# Patient Record
Sex: FEMALE | Race: WHITE | HISPANIC OR LATINO | Employment: UNEMPLOYED | URBAN - METROPOLITAN AREA
[De-identification: names, ages, dates, MRNs, and addresses within clinical notes are randomized per-mention and may not be internally consistent; named-entity substitution may affect disease eponyms.]

---
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
## 2024-09-23 ENCOUNTER — LAB VISIT (OUTPATIENT)
Dept: LAB | Facility: HOSPITAL | Age: 24
End: 2024-09-23
Attending: STUDENT IN AN ORGANIZED HEALTH CARE EDUCATION/TRAINING PROGRAM
Payer: MEDICAID

## 2024-09-23 ENCOUNTER — OFFICE VISIT (OUTPATIENT)
Dept: OBSTETRICS AND GYNECOLOGY | Facility: CLINIC | Age: 24
End: 2024-09-23
Payer: MEDICAID

## 2024-09-23 VITALS — DIASTOLIC BLOOD PRESSURE: 76 MMHG | WEIGHT: 139.31 LBS | SYSTOLIC BLOOD PRESSURE: 112 MMHG

## 2024-09-23 DIAGNOSIS — N91.2 AMENORRHEA: Primary | ICD-10-CM

## 2024-09-23 DIAGNOSIS — N91.2 AMENORRHEA: ICD-10-CM

## 2024-09-23 LAB
ABO + RH BLD: NORMAL
BASOPHILS # BLD AUTO: 0.02 K/UL (ref 0–0.2)
BASOPHILS NFR BLD: 0.3 % (ref 0–1.9)
BLD GP AB SCN CELLS X3 SERPL QL: NORMAL
DIFFERENTIAL METHOD BLD: ABNORMAL
EOSINOPHIL # BLD AUTO: 0.2 K/UL (ref 0–0.5)
EOSINOPHIL NFR BLD: 1.9 % (ref 0–8)
ERYTHROCYTE [DISTWIDTH] IN BLOOD BY AUTOMATED COUNT: 14.9 % (ref 11.5–14.5)
HBV SURFACE AG SERPL QL IA: NORMAL
HCT VFR BLD AUTO: 34.3 % (ref 37–48.5)
HCV AB SERPL QL IA: NORMAL
HGB BLD-MCNC: 11.4 G/DL (ref 12–16)
HIV 1+2 AB+HIV1 P24 AG SERPL QL IA: NORMAL
IMM GRANULOCYTES # BLD AUTO: 0.02 K/UL (ref 0–0.04)
IMM GRANULOCYTES NFR BLD AUTO: 0.3 % (ref 0–0.5)
LYMPHOCYTES # BLD AUTO: 2.2 K/UL (ref 1–4.8)
LYMPHOCYTES NFR BLD: 27.9 % (ref 18–48)
MCH RBC QN AUTO: 27 PG (ref 27–31)
MCHC RBC AUTO-ENTMCNC: 33.2 G/DL (ref 32–36)
MCV RBC AUTO: 81 FL (ref 82–98)
MONOCYTES # BLD AUTO: 0.5 K/UL (ref 0.3–1)
MONOCYTES NFR BLD: 6.7 % (ref 4–15)
NEUTROPHILS # BLD AUTO: 4.9 K/UL (ref 1.8–7.7)
NEUTROPHILS NFR BLD: 62.9 % (ref 38–73)
NRBC BLD-RTO: 0 /100 WBC
PLATELET # BLD AUTO: 236 K/UL (ref 150–450)
PMV BLD AUTO: 10.4 FL (ref 9.2–12.9)
RBC # BLD AUTO: 4.22 M/UL (ref 4–5.4)
SPECIMEN OUTDATE: NORMAL
TREPONEMA PALLIDUM IGG+IGM AB [PRESENCE] IN SERUM OR PLASMA BY IMMUNOASSAY: NONREACTIVE
WBC # BLD AUTO: 7.77 K/UL (ref 3.9–12.7)

## 2024-09-23 PROCEDURE — 36415 COLL VENOUS BLD VENIPUNCTURE: CPT | Performed by: STUDENT IN AN ORGANIZED HEALTH CARE EDUCATION/TRAINING PROGRAM

## 2024-09-23 PROCEDURE — 1160F RVW MEDS BY RX/DR IN RCRD: CPT | Mod: CPTII,,, | Performed by: STUDENT IN AN ORGANIZED HEALTH CARE EDUCATION/TRAINING PROGRAM

## 2024-09-23 PROCEDURE — 86762 RUBELLA ANTIBODY: CPT | Performed by: STUDENT IN AN ORGANIZED HEALTH CARE EDUCATION/TRAINING PROGRAM

## 2024-09-23 PROCEDURE — 87491 CHLMYD TRACH DNA AMP PROBE: CPT | Performed by: STUDENT IN AN ORGANIZED HEALTH CARE EDUCATION/TRAINING PROGRAM

## 2024-09-23 PROCEDURE — 86850 RBC ANTIBODY SCREEN: CPT | Performed by: STUDENT IN AN ORGANIZED HEALTH CARE EDUCATION/TRAINING PROGRAM

## 2024-09-23 PROCEDURE — 87389 HIV-1 AG W/HIV-1&-2 AB AG IA: CPT | Performed by: STUDENT IN AN ORGANIZED HEALTH CARE EDUCATION/TRAINING PROGRAM

## 2024-09-23 PROCEDURE — 86593 SYPHILIS TEST NON-TREP QUANT: CPT | Performed by: STUDENT IN AN ORGANIZED HEALTH CARE EDUCATION/TRAINING PROGRAM

## 2024-09-23 PROCEDURE — 86803 HEPATITIS C AB TEST: CPT | Performed by: STUDENT IN AN ORGANIZED HEALTH CARE EDUCATION/TRAINING PROGRAM

## 2024-09-23 PROCEDURE — 88142 CYTOPATH C/V THIN LAYER: CPT | Performed by: STUDENT IN AN ORGANIZED HEALTH CARE EDUCATION/TRAINING PROGRAM

## 2024-09-23 PROCEDURE — 99202 OFFICE O/P NEW SF 15 MIN: CPT | Mod: PBBFAC,PO,25 | Performed by: STUDENT IN AN ORGANIZED HEALTH CARE EDUCATION/TRAINING PROGRAM

## 2024-09-23 PROCEDURE — 86901 BLOOD TYPING SEROLOGIC RH(D): CPT | Performed by: STUDENT IN AN ORGANIZED HEALTH CARE EDUCATION/TRAINING PROGRAM

## 2024-09-23 PROCEDURE — 99999 PR PBB SHADOW E&M-NEW PATIENT-LVL II: CPT | Mod: PBBFAC,,, | Performed by: STUDENT IN AN ORGANIZED HEALTH CARE EDUCATION/TRAINING PROGRAM

## 2024-09-23 PROCEDURE — 3074F SYST BP LT 130 MM HG: CPT | Mod: CPTII,,, | Performed by: STUDENT IN AN ORGANIZED HEALTH CARE EDUCATION/TRAINING PROGRAM

## 2024-09-23 PROCEDURE — 86900 BLOOD TYPING SEROLOGIC ABO: CPT | Performed by: STUDENT IN AN ORGANIZED HEALTH CARE EDUCATION/TRAINING PROGRAM

## 2024-09-23 PROCEDURE — 87340 HEPATITIS B SURFACE AG IA: CPT | Performed by: STUDENT IN AN ORGANIZED HEALTH CARE EDUCATION/TRAINING PROGRAM

## 2024-09-23 PROCEDURE — 87086 URINE CULTURE/COLONY COUNT: CPT | Performed by: STUDENT IN AN ORGANIZED HEALTH CARE EDUCATION/TRAINING PROGRAM

## 2024-09-23 PROCEDURE — 3078F DIAST BP <80 MM HG: CPT | Mod: CPTII,,, | Performed by: STUDENT IN AN ORGANIZED HEALTH CARE EDUCATION/TRAINING PROGRAM

## 2024-09-23 PROCEDURE — 1159F MED LIST DOCD IN RCRD: CPT | Mod: CPTII,,, | Performed by: STUDENT IN AN ORGANIZED HEALTH CARE EDUCATION/TRAINING PROGRAM

## 2024-09-23 PROCEDURE — 85025 COMPLETE CBC W/AUTO DIFF WBC: CPT | Performed by: STUDENT IN AN ORGANIZED HEALTH CARE EDUCATION/TRAINING PROGRAM

## 2024-09-23 PROCEDURE — 87591 N.GONORRHOEAE DNA AMP PROB: CPT | Performed by: STUDENT IN AN ORGANIZED HEALTH CARE EDUCATION/TRAINING PROGRAM

## 2024-09-23 PROCEDURE — 99204 OFFICE O/P NEW MOD 45 MIN: CPT | Mod: S$PBB,,, | Performed by: STUDENT IN AN ORGANIZED HEALTH CARE EDUCATION/TRAINING PROGRAM

## 2024-09-23 NOTE — PROGRESS NOTES
Milagro Botello is a 23 yo  who presents to establish care for pregnancy. She was seen in the ED and underwent an US on 24 which demonstrated an IUP with cardiac activity and a B-HCG around 14,000. She denies any vaginal bleeding or abdominal pain at this time. Patient's last menstrual period was 2024 (approximate). Her UPT is Positive. This was an unplanned but now desired pregnancy. She denies PMH. Her PSH is significant for a  and another laparotomy for ovarian cysts. She is unsure why she had a  during her first pregnancy, 4 years ago. She is taking a PNV and is vaccinated against COVID.     History reviewed. No pertinent past medical history.  History reviewed. No pertinent surgical history.  No family history on file.  Review of patient's allergies indicates:   Allergen Reactions    Cinnamon Rash     Social History     Socioeconomic History    Marital status: Single   Tobacco Use    Smoking status: Never     Passive exposure: Never    Smokeless tobacco: Never   Substance and Sexual Activity    Alcohol use: Not Currently    Drug use: Never    Sexual activity: Yes     Partners: Male       ROS:  GENERAL: No fever, chills, fatigability or weight loss.  VULVAR: No pain, no lesions and no itching.  VAGINAL: No relaxation, no itching, no discharge, no abnormal bleeding and no lesions.  ABDOMEN: No abdominal pain. Denies nausea. Denies vomiting. No diarrhea. No constipation  BREAST: Denies pain. No lumps. No discharge.  URINARY: No incontinence, no nocturia, no frequency and no dysuria.  CARDIOVASCULAR: No chest pain. No shortness of breath. No leg cramps.  NEUROLOGICAL: no headaches. No vision changes.      Vitals:    24 1410   BP: 112/76     GENERAL: healthy  NECK: thyroid is normal in size without nodules or tenderness  ABDOMEN: Normal, benign.  GENITALIA: normal external genitalia, no erythema, no discharge  URETHRA: normal appearing urethra with no masses, tenderness  or lesions  VAGINA: normal vagina  CERVIX: Normal  UTERUS: normal size  ADNEXA: normal adnexa    Bedside US: IUP with likely fetal cardiac activity appreciated    Milagro was seen today for possible pregnancy.    Diagnoses and all orders for this visit:    Amenorrhea  -     C. trachomatis/N. gonorrhoeae by AMP DNA [facility]; Cervix  -     Liquid-Based Pap Smear, Screening  -     Urine culture  -     Type & Screen; Future  -     CBC Auto Differential; Future  -     Hepatitis B Surface Antigen; Future  -     HIV 1/2 Ag/Ab (4th Gen); Future  -     Hepatitis C Antibody; Future  -     Rubella Antibody, IgG; Future  -     Treponema Pallidium Antibodies IgG, IgM; Future  -     US OB/GYN Procedure ([facility]); Future    Discussed pregnancy of unknown viability and appropriate precautions    Counseled to avoid cat litter, not garden without gloves, avoid raw meat, heat up deli meat, to eat large fish like tuna no more than once a week, and to avoid soft unpasteurized cheeses.  I recommend a PNV daily.  She should avoid ibuprofen.

## 2024-09-24 LAB
RUBV IGG SER-ACNC: 21.2 IU/ML
RUBV IGG SER-IMP: REACTIVE

## 2024-09-25 LAB
BACTERIA UR CULT: NORMAL
C TRACH DNA SPEC QL NAA+PROBE: NOT DETECTED
N GONORRHOEA DNA SPEC QL NAA+PROBE: NOT DETECTED

## 2024-09-26 ENCOUNTER — PROCEDURE VISIT (OUTPATIENT)
Dept: MATERNAL FETAL MEDICINE | Facility: CLINIC | Age: 24
End: 2024-09-26
Payer: MEDICAID

## 2024-09-26 DIAGNOSIS — N91.2 AMENORRHEA: ICD-10-CM

## 2024-09-26 PROCEDURE — 76801 OB US < 14 WKS SINGLE FETUS: CPT | Mod: PBBFAC,PO | Performed by: OBSTETRICS & GYNECOLOGY

## 2024-09-27 LAB
FINAL PATHOLOGIC DIAGNOSIS: NORMAL
Lab: NORMAL

## 2024-10-21 ENCOUNTER — LAB VISIT (OUTPATIENT)
Dept: LAB | Facility: HOSPITAL | Age: 24
End: 2024-10-21
Attending: STUDENT IN AN ORGANIZED HEALTH CARE EDUCATION/TRAINING PROGRAM
Payer: MEDICAID

## 2024-10-21 ENCOUNTER — ROUTINE PRENATAL (OUTPATIENT)
Dept: OBSTETRICS AND GYNECOLOGY | Facility: CLINIC | Age: 24
End: 2024-10-21
Payer: MEDICAID

## 2024-10-21 VITALS — SYSTOLIC BLOOD PRESSURE: 115 MMHG | WEIGHT: 141.13 LBS | DIASTOLIC BLOOD PRESSURE: 78 MMHG

## 2024-10-21 DIAGNOSIS — Z98.891 HISTORY OF CESAREAN SECTION: ICD-10-CM

## 2024-10-21 DIAGNOSIS — Z34.81 ENCOUNTER FOR SUPERVISION OF OTHER NORMAL PREGNANCY IN FIRST TRIMESTER: ICD-10-CM

## 2024-10-21 DIAGNOSIS — Z3A.09 9 WEEKS GESTATION OF PREGNANCY: Primary | ICD-10-CM

## 2024-10-21 PROBLEM — Z34.91 ENCOUNTER FOR SUPERVISION OF NORMAL PREGNANCY IN FIRST TRIMESTER: Status: ACTIVE | Noted: 2024-10-21

## 2024-10-21 LAB
BILIRUB SERPL-MCNC: NORMAL MG/DL
BLOOD URINE, POC: NORMAL
CLARITY UR: CLEAR
COLOR, POC UA: YELLOW
GLUCOSE UR QL STRIP: NORMAL
KETONES UR QL STRIP: NORMAL
LEUKOCYTE ESTERASE URINE, POC: NORMAL
NITRITE, POC UA: NORMAL
PH, POC UA: 6
PROTEIN, POC: NORMAL
SPECIFIC GRAVITY, POC UA: 1.03
UROBILINOGEN, POC UA: 0.2

## 2024-10-21 PROCEDURE — 99999PBSHW POCT URINE DIPSTICK WITHOUT MICROSCOPE: Mod: PBBFAC,,,

## 2024-10-21 PROCEDURE — 99212 OFFICE O/P EST SF 10 MIN: CPT | Mod: PBBFAC,TH,PO | Performed by: STUDENT IN AN ORGANIZED HEALTH CARE EDUCATION/TRAINING PROGRAM

## 2024-10-21 PROCEDURE — 84163 PAPPA SERUM: CPT | Performed by: STUDENT IN AN ORGANIZED HEALTH CARE EDUCATION/TRAINING PROGRAM

## 2024-10-21 PROCEDURE — 81002 URINALYSIS NONAUTO W/O SCOPE: CPT | Mod: PBBFAC,PO | Performed by: STUDENT IN AN ORGANIZED HEALTH CARE EDUCATION/TRAINING PROGRAM

## 2024-10-21 PROCEDURE — 99999 PR PBB SHADOW E&M-EST. PATIENT-LVL II: CPT | Mod: PBBFAC,,, | Performed by: STUDENT IN AN ORGANIZED HEALTH CARE EDUCATION/TRAINING PROGRAM

## 2024-10-21 PROCEDURE — 36415 COLL VENOUS BLD VENIPUNCTURE: CPT | Performed by: STUDENT IN AN ORGANIZED HEALTH CARE EDUCATION/TRAINING PROGRAM

## 2024-10-21 NOTE — PROGRESS NOTES
No chief complaint on file.      24 y.o., at 12w4d by Estimated Date of Delivery: 25    Complaints today: Patient doing well. No complaints at this time.     ROS  OBSTETRICS:   Contractions: n   Bleeding: n   Loss of fluid: n   Fetal movement: not yet  GASTRO:   Nausea: n   Vomiting: n      OB History    Para Term  AB Living   2 1 1     1   SAB IAB Ectopic Multiple Live Births                  # Outcome Date GA Lbr Dae/2nd Weight Sex Type Anes PTL Lv   2 Current            1 Term                Dating reviewed  Allergies and problem list reviewed and updated  Medical and surgical history reviewed  Prenatal labs reviewed and updated    PHYSICAL EXAM  /78   Wt 64 kg (141 lb 1.5 oz)   LMP 2024 (Approximate)     GENERAL: No acute distress  NEURO: Alert and oriented x3  PSYCH: Normal mood and affect  PULMONARY: Non-labored respiration  ABDomen: Soft, gravid, nontender    ASSESSMENT AND PLAN     Problems (from 10/21/24 to present)       Problem Noted Diagnosed Resolved    Encounter for supervision of normal pregnancy in first trimester 10/21/2024 by Rambo Andrade MD  No    History of  section 10/21/2024 by Rambo Andrade MD  No            Integrated 1 today     labor precautions given  Follow-up: 4 weeks

## 2024-10-25 LAB — INTEGRATED SCN PATIENT-IMP NAR: NORMAL

## 2024-11-18 ENCOUNTER — LAB VISIT (OUTPATIENT)
Dept: LAB | Facility: HOSPITAL | Age: 24
End: 2024-11-18
Attending: STUDENT IN AN ORGANIZED HEALTH CARE EDUCATION/TRAINING PROGRAM
Payer: MEDICAID

## 2024-11-18 ENCOUNTER — ROUTINE PRENATAL (OUTPATIENT)
Dept: OBSTETRICS AND GYNECOLOGY | Facility: CLINIC | Age: 24
End: 2024-11-18
Payer: MEDICAID

## 2024-11-18 VITALS — SYSTOLIC BLOOD PRESSURE: 107 MMHG | WEIGHT: 145.06 LBS | DIASTOLIC BLOOD PRESSURE: 70 MMHG

## 2024-11-18 DIAGNOSIS — Z3A.16 16 WEEKS GESTATION OF PREGNANCY: Primary | ICD-10-CM

## 2024-11-18 DIAGNOSIS — Z34.81 ENCOUNTER FOR SUPERVISION OF OTHER NORMAL PREGNANCY IN FIRST TRIMESTER: ICD-10-CM

## 2024-11-18 DIAGNOSIS — Z98.891 HISTORY OF CESAREAN SECTION: ICD-10-CM

## 2024-11-18 LAB
BILIRUB SERPL-MCNC: NORMAL MG/DL
BLOOD URINE, POC: NORMAL
CLARITY, POC UA: CLEAR
COLOR, POC UA: YELLOW
GLUCOSE UR QL STRIP: NORMAL
KETONES UR QL STRIP: NORMAL
LEUKOCYTE ESTERASE URINE, POC: NORMAL
NITRITE, POC UA: NORMAL
PH, POC UA: 6
PROTEIN, POC: NORMAL
SPECIFIC GRAVITY, POC UA: 1.02
UROBILINOGEN, POC UA: 0.2

## 2024-11-18 PROCEDURE — 81002 URINALYSIS NONAUTO W/O SCOPE: CPT | Mod: PBBFAC,PO | Performed by: STUDENT IN AN ORGANIZED HEALTH CARE EDUCATION/TRAINING PROGRAM

## 2024-11-18 PROCEDURE — 36415 COLL VENOUS BLD VENIPUNCTURE: CPT | Performed by: STUDENT IN AN ORGANIZED HEALTH CARE EDUCATION/TRAINING PROGRAM

## 2024-11-18 PROCEDURE — 99999PBSHW POCT URINE DIPSTICK WITHOUT MICROSCOPE: Mod: PBBFAC,,,

## 2024-11-18 PROCEDURE — 99213 OFFICE O/P EST LOW 20 MIN: CPT | Mod: TH,S$PBB,, | Performed by: STUDENT IN AN ORGANIZED HEALTH CARE EDUCATION/TRAINING PROGRAM

## 2024-11-18 PROCEDURE — 99212 OFFICE O/P EST SF 10 MIN: CPT | Mod: PBBFAC,TH,PO | Performed by: STUDENT IN AN ORGANIZED HEALTH CARE EDUCATION/TRAINING PROGRAM

## 2024-11-18 PROCEDURE — 81511 FTL CGEN ABNOR FOUR ANAL: CPT | Performed by: STUDENT IN AN ORGANIZED HEALTH CARE EDUCATION/TRAINING PROGRAM

## 2024-11-18 PROCEDURE — 99999 PR PBB SHADOW E&M-EST. PATIENT-LVL II: CPT | Mod: PBBFAC,,, | Performed by: STUDENT IN AN ORGANIZED HEALTH CARE EDUCATION/TRAINING PROGRAM

## 2024-11-18 RX ORDER — TRIAMCINOLONE ACETONIDE 1 MG/G
OINTMENT TOPICAL 2 TIMES DAILY
Qty: 80 G | Refills: 0 | Status: SHIPPED | OUTPATIENT
Start: 2024-11-18

## 2024-11-18 NOTE — PROGRESS NOTES
Chief Complaint   Patient presents with    Routine Prenatal Visit       24 y.o., at 16w4d by Estimated Date of Delivery: 25    Complaints today: Patient doing well apart from a mild rash in her inner thighs. No other complaints.     ROS  OBSTETRICS:   Contractions: n   Bleeding: n   Loss of fluid: n   Fetal movement: y  GASTRO:   Nausea: n   Vomiting: n      OB History    Para Term  AB Living   2 1 1     1   SAB IAB Ectopic Multiple Live Births                  # Outcome Date GA Lbr Dae/2nd Weight Sex Type Anes PTL Lv   2 Current            1 Term                Dating reviewed  Allergies and problem list reviewed and updated  Medical and surgical history reviewed  Prenatal labs reviewed and updated    PHYSICAL EXAM  /70   Wt 65.8 kg (145 lb 1 oz)   LMP 2024 (Approximate)     GENERAL: No acute distress  NEURO: Alert and oriented x3  PSYCH: Normal mood and affect  PULMONARY: Non-labored respiration  ABDomen: Soft, gravid, nontender    ASSESSMENT AND PLAN     Problems (from 10/21/24 to present)       Problem Noted Diagnosed Resolved    Encounter for supervision of normal pregnancy in first trimester 10/21/2024 by Rambo Andrade MD  No    History of  section 10/21/2024 by Rambo Andrade MD  No            Integrated 2  Anatomy scan scheduled  Triamcinolone ointment for rash     labor precautions given  Follow-up: 4 weeks

## 2024-11-21 LAB
# FETUSES US: NORMAL
AFP MOM SERPL: 0.84
AFP SERPL-MCNC: 30.6 NG/ML
AGE AT DELIVERY: 23
B-HCG MOM SERPL: 0.96
B-HCG SERPL-ACNC: 29.5 IU/ML
COLLECT DATE BLD: NORMAL
COLLECT DATE: NORMAL
FET TS 21 RISK FROM MAT AGE: NORMAL
GA (DAYS): 4 D
GA (WEEKS): 12 WK
GA METHOD: NORMAL
GEST. AGE (DAYS) 2ND SAMPLE (SI2): 4
GEST. AGE (WKS) 2ND SAMPLE (SI2): 16
IDDM PATIENT QL: NORMAL
INHIBIN A MOM SERPL: 1.36
INHIBIN A SERPL-MCNC: 210.5 PG/ML
INTEGRATED SCN PATIENT-IMP NAR: NORMAL
INTEGRATED SCN PATIENT-IMP: NEGATIVE
PAPP-A MOM SERPL: 0.79
PAPP-A SERPL-MCNC: 709.8 NG/ML
SMOKING STATUS FTND: NO
TS 18 RISK FETUS: NORMAL
TS 21 RISK FETUS: NORMAL
U ESTRIOL MOM SERPL: 1.21
U ESTRIOL SERPL-MCNC: 1.56 NG/ML

## 2024-12-16 ENCOUNTER — TELEPHONE (OUTPATIENT)
Dept: OBSTETRICS AND GYNECOLOGY | Facility: CLINIC | Age: 24
End: 2024-12-16
Payer: MEDICAID

## 2024-12-16 NOTE — TELEPHONE ENCOUNTER
----- Message from Corrina sent at 2024 10:03 AM CST -----  [phone]  Type: Patient Call Back    Who called: self     What is the request in detail: pt stated she was not aware she had her appts today, stated no one called her back when she requested an appt. She would like a call back to change the us and visit with doctor.     Can the clinic reply by [facility]? no    Would the patient rather a call back or a response via [facility]? Call back     Best call back number: [phone]

## 2024-12-23 ENCOUNTER — PROCEDURE VISIT (OUTPATIENT)
Dept: MATERNAL FETAL MEDICINE | Facility: CLINIC | Age: 24
End: 2024-12-23
Payer: MEDICAID

## 2024-12-23 ENCOUNTER — ROUTINE PRENATAL (OUTPATIENT)
Dept: OBSTETRICS AND GYNECOLOGY | Facility: CLINIC | Age: 24
End: 2024-12-23
Payer: MEDICAID

## 2024-12-23 VITALS — SYSTOLIC BLOOD PRESSURE: 111 MMHG | WEIGHT: 152.13 LBS | DIASTOLIC BLOOD PRESSURE: 72 MMHG

## 2024-12-23 DIAGNOSIS — Z98.891 HISTORY OF CESAREAN SECTION: ICD-10-CM

## 2024-12-23 DIAGNOSIS — Z34.81 ENCOUNTER FOR SUPERVISION OF OTHER NORMAL PREGNANCY IN FIRST TRIMESTER: ICD-10-CM

## 2024-12-23 DIAGNOSIS — Z3A.21 21 WEEKS GESTATION OF PREGNANCY: Primary | ICD-10-CM

## 2024-12-23 PROBLEM — Z34.92 ENCOUNTER FOR SUPERVISION OF NORMAL PREGNANCY IN SECOND TRIMESTER: Status: ACTIVE | Noted: 2024-10-21

## 2024-12-23 LAB
BILIRUB SERPL-MCNC: NEGATIVE MG/DL
BLOOD URINE, POC: NORMAL
CLARITY, POC UA: NORMAL
COLOR, POC UA: NORMAL
GLUCOSE UR QL STRIP: NEGATIVE
KETONES UR QL STRIP: NEGATIVE
LEUKOCYTE ESTERASE URINE, POC: NORMAL
NITRITE, POC UA: NEGATIVE
PH, POC UA: 6.5
PROTEIN, POC: 30
SPECIFIC GRAVITY, POC UA: 1.02
UROBILINOGEN, POC UA: 0.2

## 2024-12-23 PROCEDURE — 99211 OFF/OP EST MAY X REQ PHY/QHP: CPT | Mod: PBBFAC,PO | Performed by: STUDENT IN AN ORGANIZED HEALTH CARE EDUCATION/TRAINING PROGRAM

## 2024-12-23 PROCEDURE — 99999PBSHW POCT URINE DIPSTICK WITHOUT MICROSCOPE: Mod: PBBFAC,,,

## 2024-12-23 PROCEDURE — 81002 URINALYSIS NONAUTO W/O SCOPE: CPT | Mod: PBBFAC,PO | Performed by: STUDENT IN AN ORGANIZED HEALTH CARE EDUCATION/TRAINING PROGRAM

## 2024-12-23 PROCEDURE — 99999 PR PBB SHADOW E&M-EST. PATIENT-LVL I: CPT | Mod: PBBFAC,,, | Performed by: STUDENT IN AN ORGANIZED HEALTH CARE EDUCATION/TRAINING PROGRAM

## 2024-12-23 NOTE — PROGRESS NOTES
Chief Complaint   Patient presents with    Routine Prenatal Visit       24 y.o., at 21w4d by Estimated Date of Delivery: 25    Complaints today: Patient doing well. No complaints at this time.     ROS  OBSTETRICS:   Contractions: n   Bleeding: n   Loss of fluid: n   Fetal movement: y  GASTRO:   Nausea: n   Vomiting: n      OB History    Para Term  AB Living   2 1 1     1   SAB IAB Ectopic Multiple Live Births                  # Outcome Date GA Lbr Dae/2nd Weight Sex Type Anes PTL Lv   2 Current            1 Term                Dating reviewed  Allergies and problem list reviewed and updated  Medical and surgical history reviewed  Prenatal labs reviewed and updated    PHYSICAL EXAM  /72   Wt 69 kg (152 lb 1.9 oz)   LMP 2024 (Approximate)     GENERAL: No acute distress  NEURO: Alert and oriented x3  PSYCH: Normal mood and affect  PULMONARY: Non-labored respiration  ABDomen: Soft, gravid, nontender    ASSESSMENT AND PLAN     Problems (from 10/21/24 to present)       Problem Noted Diagnosed Resolved    Encounter for supervision of normal pregnancy in second trimester 10/21/2024 by Rambo Andrade MD  No    History of  section 10/21/2024 by Rambo Andrade MD  No            Anatomy scan done today  GTT at next visit     labor precautions given  Follow-up: 4 weeks

## 2025-01-13 ENCOUNTER — LAB VISIT (OUTPATIENT)
Dept: LAB | Facility: HOSPITAL | Age: 25
End: 2025-01-13
Attending: STUDENT IN AN ORGANIZED HEALTH CARE EDUCATION/TRAINING PROGRAM
Payer: MEDICAID

## 2025-01-13 ENCOUNTER — TELEPHONE (OUTPATIENT)
Dept: OBSTETRICS AND GYNECOLOGY | Facility: CLINIC | Age: 25
End: 2025-01-13

## 2025-01-13 ENCOUNTER — ROUTINE PRENATAL (OUTPATIENT)
Dept: OBSTETRICS AND GYNECOLOGY | Facility: CLINIC | Age: 25
End: 2025-01-13
Payer: MEDICAID

## 2025-01-13 VITALS — SYSTOLIC BLOOD PRESSURE: 107 MMHG | WEIGHT: 157.63 LBS | DIASTOLIC BLOOD PRESSURE: 62 MMHG

## 2025-01-13 DIAGNOSIS — Z3A.24 24 WEEKS GESTATION OF PREGNANCY: Primary | ICD-10-CM

## 2025-01-13 DIAGNOSIS — Z34.82 ENCOUNTER FOR SUPERVISION OF OTHER NORMAL PREGNANCY IN SECOND TRIMESTER: ICD-10-CM

## 2025-01-13 DIAGNOSIS — N91.2 AMENORRHEA: ICD-10-CM

## 2025-01-13 DIAGNOSIS — Z98.891 HISTORY OF CESAREAN SECTION: ICD-10-CM

## 2025-01-13 LAB
GLUCOSE SERPL-MCNC: 98 MG/DL (ref 70–140)
HCG INTACT+B SERPL-ACNC: NORMAL MIU/ML

## 2025-01-13 PROCEDURE — 99212 OFFICE O/P EST SF 10 MIN: CPT | Mod: PBBFAC,TH,PO | Performed by: STUDENT IN AN ORGANIZED HEALTH CARE EDUCATION/TRAINING PROGRAM

## 2025-01-13 PROCEDURE — 82950 GLUCOSE TEST: CPT | Performed by: STUDENT IN AN ORGANIZED HEALTH CARE EDUCATION/TRAINING PROGRAM

## 2025-01-13 PROCEDURE — 99213 OFFICE O/P EST LOW 20 MIN: CPT | Mod: TH,S$PBB,, | Performed by: STUDENT IN AN ORGANIZED HEALTH CARE EDUCATION/TRAINING PROGRAM

## 2025-01-13 PROCEDURE — 99999 PR PBB SHADOW E&M-EST. PATIENT-LVL II: CPT | Mod: PBBFAC,,, | Performed by: STUDENT IN AN ORGANIZED HEALTH CARE EDUCATION/TRAINING PROGRAM

## 2025-01-13 PROCEDURE — 36415 COLL VENOUS BLD VENIPUNCTURE: CPT | Performed by: STUDENT IN AN ORGANIZED HEALTH CARE EDUCATION/TRAINING PROGRAM

## 2025-01-13 PROCEDURE — 84702 CHORIONIC GONADOTROPIN TEST: CPT | Performed by: STUDENT IN AN ORGANIZED HEALTH CARE EDUCATION/TRAINING PROGRAM

## 2025-01-13 NOTE — PROGRESS NOTES
Chief Complaint   Patient presents with    Routine Prenatal Visit       24 y.o., at 24w4d by Estimated Date of Delivery: 25    Complaints today: Patient doing well. No complaints at this time.     ROS  OBSTETRICS:   Contractions: n   Bleeding: n   Loss of fluid: n   Fetal movement: y  GASTRO:   Nausea: n   Vomiting: n      OB History    Para Term  AB Living   2 1 1     1   SAB IAB Ectopic Multiple Live Births                  # Outcome Date GA Lbr Dae/2nd Weight Sex Type Anes PTL Lv   2 Current            1 Term                Dating reviewed  Allergies and problem list reviewed and updated  Medical and surgical history reviewed  Prenatal labs reviewed and updated    PHYSICAL EXAM  /62   Wt 71.5 kg (157 lb 10.1 oz)   LMP 2024 (Approximate)     GENERAL: No acute distress  NEURO: Alert and oriented x3  PSYCH: Normal mood and affect  PULMONARY: Non-labored respiration  ABDomen: Soft, gravid, nontender    ASSESSMENT AND PLAN     Problems (from 10/21/24 to present)       Problem Noted Diagnosed Resolved    Encounter for supervision of normal pregnancy in second trimester 10/21/2024 by Rambo Andrade MD  No    History of  section 10/21/2024 by Rambo Andrade MD  No            GTT today  F/U Anatomy ordered     labor precautions given  Follow-up: 4 weeks

## 2025-02-10 ENCOUNTER — ROUTINE PRENATAL (OUTPATIENT)
Dept: OBSTETRICS AND GYNECOLOGY | Facility: CLINIC | Age: 25
End: 2025-02-10
Payer: MEDICAID

## 2025-02-10 VITALS — SYSTOLIC BLOOD PRESSURE: 115 MMHG | DIASTOLIC BLOOD PRESSURE: 73 MMHG | WEIGHT: 164 LBS

## 2025-02-10 DIAGNOSIS — Z34.82 ENCOUNTER FOR SUPERVISION OF OTHER NORMAL PREGNANCY IN SECOND TRIMESTER: ICD-10-CM

## 2025-02-10 DIAGNOSIS — Z3A.28 28 WEEKS GESTATION OF PREGNANCY: Primary | ICD-10-CM

## 2025-02-10 DIAGNOSIS — Z98.891 HISTORY OF CESAREAN SECTION: ICD-10-CM

## 2025-02-10 LAB
BILIRUB SERPL-MCNC: NORMAL MG/DL
BLOOD URINE, POC: NORMAL
CLARITY, POC UA: CLEAR
COLOR, POC UA: YELLOW
GLUCOSE UR QL STRIP: NORMAL
KETONES UR QL STRIP: NORMAL
LEUKOCYTE ESTERASE URINE, POC: NORMAL
NITRITE, POC UA: NORMAL
PH, POC UA: 6
PROTEIN, POC: NORMAL
SPECIFIC GRAVITY, POC UA: 1.03
UROBILINOGEN, POC UA: 0.2

## 2025-02-10 PROCEDURE — 90715 TDAP VACCINE 7 YRS/> IM: CPT | Mod: PBBFAC,PO

## 2025-02-10 PROCEDURE — 99999 PR PBB SHADOW E&M-EST. PATIENT-LVL II: CPT | Mod: PBBFAC,,, | Performed by: STUDENT IN AN ORGANIZED HEALTH CARE EDUCATION/TRAINING PROGRAM

## 2025-02-10 PROCEDURE — 90471 IMMUNIZATION ADMIN: CPT | Mod: PBBFAC,PO

## 2025-02-10 PROCEDURE — 99999PBSHW POCT URINE DIPSTICK WITHOUT MICROSCOPE: Mod: PBBFAC,,,

## 2025-02-10 PROCEDURE — 99212 OFFICE O/P EST SF 10 MIN: CPT | Mod: PBBFAC,TH,PO | Performed by: STUDENT IN AN ORGANIZED HEALTH CARE EDUCATION/TRAINING PROGRAM

## 2025-02-10 PROCEDURE — 81002 URINALYSIS NONAUTO W/O SCOPE: CPT | Mod: PBBFAC,PO | Performed by: STUDENT IN AN ORGANIZED HEALTH CARE EDUCATION/TRAINING PROGRAM

## 2025-02-10 PROCEDURE — 99999PBSHW PR PBB SHADOW TECHNICAL ONLY FILED TO HB: Mod: PBBFAC,,,

## 2025-02-10 RX ADMIN — TETANUS TOXOID, REDUCED DIPHTHERIA TOXOID AND ACELLULAR PERTUSSIS VACCINE, ADSORBED 0.5 ML: 5; 2.5; 8; 8; 2.5 SUSPENSION INTRAMUSCULAR at 02:02

## 2025-02-10 NOTE — PROGRESS NOTES
"Chief Complaint   Patient presents with    Routine Prenatal Visit       24 y.o., at 28w4d by Estimated Date of Delivery: 25    Complaints today: Patient doing well apart from some GERD and "skin allergies."    ROS  OBSTETRICS:   Contractions: n   Bleeding: n   Loss of fluid: n   Fetal movement: y  GASTRO:   Nausea: n   Vomiting: n      OB History    Para Term  AB Living   2 1 1     1   SAB IAB Ectopic Multiple Live Births                  # Outcome Date GA Lbr Dae/2nd Weight Sex Type Anes PTL Lv   2 Current            1 Term                Dating reviewed  Allergies and problem list reviewed and updated  Medical and surgical history reviewed  Prenatal labs reviewed and updated    PHYSICAL EXAM  /73   Wt 74.4 kg (164 lb 0.4 oz)   LMP 2024 (Approximate)     GENERAL: No acute distress  NEURO: Alert and oriented x3  PSYCH: Normal mood and affect  PULMONARY: Non-labored respiration  ABDomen: Soft, gravid, nontender    ASSESSMENT AND PLAN     Problems (from 10/21/24 to present)       Problem Noted Diagnosed Resolved    Encounter for supervision of normal pregnancy in second trimester 10/21/2024 by Rambo Andrade MD  No    History of  section 10/21/2024 by Rambo Andrade MD  No            TDAP today  32 wk U/S     labor precautions given  Follow-up: 4 weeks    "

## 2025-02-10 NOTE — PROGRESS NOTES
Tdap Administered IM to [facility]. Tolerated well, no redness or swelling. Patient instructed to stay in office for 15 minutes in case of reaction. Patient verbalizes understanding.

## 2025-02-20 ENCOUNTER — TELEPHONE (OUTPATIENT)
Dept: OBSTETRICS AND GYNECOLOGY | Facility: CLINIC | Age: 25
End: 2025-02-20
Payer: MEDICAID

## 2025-02-20 NOTE — TELEPHONE ENCOUNTER
ID# [id number]. Dr Andrade will not be in the office on 2/24 appt r/s to 2/26 at 815a pt verbalizes understanding.

## 2025-02-26 ENCOUNTER — ROUTINE PRENATAL (OUTPATIENT)
Dept: OBSTETRICS AND GYNECOLOGY | Facility: CLINIC | Age: 25
End: 2025-02-26
Payer: MEDICAID

## 2025-02-26 VITALS — SYSTOLIC BLOOD PRESSURE: 102 MMHG | HEART RATE: 96 BPM | DIASTOLIC BLOOD PRESSURE: 69 MMHG | WEIGHT: 171.31 LBS

## 2025-02-26 DIAGNOSIS — Z3A.30 30 WEEKS GESTATION OF PREGNANCY: Primary | ICD-10-CM

## 2025-02-26 DIAGNOSIS — Z34.83 ENCOUNTER FOR SUPERVISION OF OTHER NORMAL PREGNANCY IN THIRD TRIMESTER: ICD-10-CM

## 2025-02-26 DIAGNOSIS — Z98.891 HISTORY OF CESAREAN SECTION: ICD-10-CM

## 2025-02-26 PROBLEM — Z34.93 ENCOUNTER FOR SUPERVISION OF NORMAL PREGNANCY IN THIRD TRIMESTER: Status: ACTIVE | Noted: 2024-10-21

## 2025-02-26 PROCEDURE — 81002 URINALYSIS NONAUTO W/O SCOPE: CPT | Mod: PBBFAC,PO | Performed by: STUDENT IN AN ORGANIZED HEALTH CARE EDUCATION/TRAINING PROGRAM

## 2025-02-26 PROCEDURE — 99999 PR PBB SHADOW E&M-EST. PATIENT-LVL II: CPT | Mod: PBBFAC,,, | Performed by: STUDENT IN AN ORGANIZED HEALTH CARE EDUCATION/TRAINING PROGRAM

## 2025-02-26 PROCEDURE — 99999PBSHW POCT URINE DIPSTICK WITHOUT MICROSCOPE: Mod: PBBFAC,,,

## 2025-02-26 PROCEDURE — 99212 OFFICE O/P EST SF 10 MIN: CPT | Mod: PBBFAC,TH,PO | Performed by: STUDENT IN AN ORGANIZED HEALTH CARE EDUCATION/TRAINING PROGRAM

## 2025-02-26 NOTE — PROGRESS NOTES
Chief Complaint   Patient presents with    Routine Prenatal Visit       24 y.o., at 30w6d by Estimated Date of Delivery: 25    Complaints today: Patient doing well. Intermittent swelling of feet.     ROS  OBSTETRICS:   Contractions: n   Bleeding: n   Loss of fluid: n   Fetal movement: y  GASTRO:   Nausea: n   Vomiting: n      OB History    Para Term  AB Living   2 1 1   1   SAB IAB Ectopic Multiple Live Births       1      # Outcome Date GA Lbr Dae/2nd Weight Sex Type Anes PTL Lv   2 Current            1 Term         ADOLPH       Dating reviewed  Allergies and problem list reviewed and updated  Medical and surgical history reviewed  Prenatal labs reviewed and updated    PHYSICAL EXAM  /69   Pulse 96   Wt 77.7 kg (171 lb 4.8 oz)   LMP 2024 (Approximate)     GENERAL: No acute distress  NEURO: Alert and oriented x3  PSYCH: Normal mood and affect  PULMONARY: Non-labored respiration  ABDomen: Soft, gravid, nontender    ASSESSMENT AND PLAN     Problems (from 10/21/24 to present)       Problem Noted Diagnosed Resolved    Encounter for supervision of normal pregnancy in third trimester 10/21/2024 by Rambo Andrade MD  No    History of  section 10/21/2024 by Rambo Andrdae MD  No            Consents signed  Growth scan ordered     labor precautions given  Follow-up: 2 weeks    - - -

## 2025-03-10 ENCOUNTER — ROUTINE PRENATAL (OUTPATIENT)
Dept: OBSTETRICS AND GYNECOLOGY | Facility: CLINIC | Age: 25
End: 2025-03-10
Payer: MEDICAID

## 2025-03-10 VITALS — DIASTOLIC BLOOD PRESSURE: 72 MMHG | WEIGHT: 174.38 LBS | SYSTOLIC BLOOD PRESSURE: 110 MMHG

## 2025-03-10 DIAGNOSIS — Z34.83 ENCOUNTER FOR SUPERVISION OF OTHER NORMAL PREGNANCY IN THIRD TRIMESTER: ICD-10-CM

## 2025-03-10 DIAGNOSIS — Z3A.32 32 WEEKS GESTATION OF PREGNANCY: Primary | ICD-10-CM

## 2025-03-10 DIAGNOSIS — Z98.891 HISTORY OF CESAREAN SECTION: ICD-10-CM

## 2025-03-10 LAB
BILIRUB SERPL-MCNC: NORMAL MG/DL
BLOOD URINE, POC: NORMAL
CLARITY, POC UA: CLEAR
COLOR, POC UA: YELLOW
GLUCOSE UR QL STRIP: NORMAL
KETONES UR QL STRIP: NORMAL
LEUKOCYTE ESTERASE URINE, POC: NORMAL
NITRITE, POC UA: NORMAL
PH, POC UA: 7
PROTEIN, POC: NORMAL
SPECIFIC GRAVITY, POC UA: 1.01
UROBILINOGEN, POC UA: 0.2

## 2025-03-10 PROCEDURE — 81002 URINALYSIS NONAUTO W/O SCOPE: CPT | Mod: PBBFAC,PO | Performed by: STUDENT IN AN ORGANIZED HEALTH CARE EDUCATION/TRAINING PROGRAM

## 2025-03-10 PROCEDURE — 99999 PR PBB SHADOW E&M-EST. PATIENT-LVL II: CPT | Mod: PBBFAC,,, | Performed by: STUDENT IN AN ORGANIZED HEALTH CARE EDUCATION/TRAINING PROGRAM

## 2025-03-10 PROCEDURE — 99212 OFFICE O/P EST SF 10 MIN: CPT | Mod: PBBFAC,TH,PO | Performed by: STUDENT IN AN ORGANIZED HEALTH CARE EDUCATION/TRAINING PROGRAM

## 2025-03-10 PROCEDURE — 99999PBSHW POCT URINE DIPSTICK WITHOUT MICROSCOPE: Mod: PBBFAC,,,

## 2025-03-10 NOTE — PROGRESS NOTES
"No chief complaint on file.      24 y.o., at 32w4d by Estimated Date of Delivery: 25    Complaints today: Patient doing well. Having some infrequent "pulse-like" pain in the vagina from time to time.     ROS  OBSTETRICS:   Contractions: n   Bleeding: n   Loss of fluid: n   Fetal movement: y  GASTRO:   Nausea: n   Vomiting: n      OB History    Para Term  AB Living   2 1 1   1   SAB IAB Ectopic Multiple Live Births       1      # Outcome Date GA Lbr Dae/2nd Weight Sex Type Anes PTL Lv   2 Current            1 Term         ADOLPH       Dating reviewed  Allergies and problem list reviewed and updated  Medical and surgical history reviewed  Prenatal labs reviewed and updated    PHYSICAL EXAM  /72   Wt 79.1 kg (174 lb 6.1 oz)   LMP 2024 (Approximate)     GENERAL: No acute distress  NEURO: Alert and oriented x3  PSYCH: Normal mood and affect  PULMONARY: Non-labored respiration  ABDomen: Soft, gravid, nontender    ASSESSMENT AND PLAN     Problems (from 10/21/24 to present)       Problem Noted Diagnosed Resolved    Encounter for supervision of normal pregnancy in third trimester 10/21/2024 by Rambo Andrade MD  No    History of  section 10/21/2024 by Rambo Andrade MD  No            PNC Up to date  S>D, macrosomia? Growth already scheduled     labor precautions given  Follow-up: 2 weeks    "

## 2025-03-24 ENCOUNTER — ROUTINE PRENATAL (OUTPATIENT)
Dept: OBSTETRICS AND GYNECOLOGY | Facility: CLINIC | Age: 25
End: 2025-03-24
Payer: MEDICAID

## 2025-03-24 VITALS — WEIGHT: 180.75 LBS | DIASTOLIC BLOOD PRESSURE: 73 MMHG | SYSTOLIC BLOOD PRESSURE: 113 MMHG

## 2025-03-24 DIAGNOSIS — Z3A.34 34 WEEKS GESTATION OF PREGNANCY: Primary | ICD-10-CM

## 2025-03-24 DIAGNOSIS — Z98.891 HISTORY OF CESAREAN SECTION: ICD-10-CM

## 2025-03-24 DIAGNOSIS — Z34.83 ENCOUNTER FOR SUPERVISION OF OTHER NORMAL PREGNANCY IN THIRD TRIMESTER: ICD-10-CM

## 2025-03-24 LAB
BILIRUB SERPL-MCNC: NORMAL MG/DL
BLOOD URINE, POC: NORMAL
CLARITY, POC UA: CLEAR
COLOR, POC UA: NORMAL
GLUCOSE UR QL STRIP: NORMAL
KETONES UR QL STRIP: NORMAL
LEUKOCYTE ESTERASE URINE, POC: NORMAL
NITRITE, POC UA: NORMAL
PH, POC UA: 7
PROTEIN, POC: NORMAL
SPECIFIC GRAVITY, POC UA: 1.01
UROBILINOGEN, POC UA: 0.2

## 2025-03-24 PROCEDURE — 99999 PR PBB SHADOW E&M-EST. PATIENT-LVL II: CPT | Mod: PBBFAC,,, | Performed by: STUDENT IN AN ORGANIZED HEALTH CARE EDUCATION/TRAINING PROGRAM

## 2025-03-24 PROCEDURE — 99999PBSHW POCT URINE DIPSTICK WITHOUT MICROSCOPE: Mod: PBBFAC,,,

## 2025-03-24 PROCEDURE — 99214 OFFICE O/P EST MOD 30 MIN: CPT | Mod: TH,S$PBB,, | Performed by: STUDENT IN AN ORGANIZED HEALTH CARE EDUCATION/TRAINING PROGRAM

## 2025-03-24 PROCEDURE — 99212 OFFICE O/P EST SF 10 MIN: CPT | Mod: PBBFAC,TH,PO | Performed by: STUDENT IN AN ORGANIZED HEALTH CARE EDUCATION/TRAINING PROGRAM

## 2025-03-24 PROCEDURE — 81002 URINALYSIS NONAUTO W/O SCOPE: CPT | Mod: PBBFAC,PO | Performed by: STUDENT IN AN ORGANIZED HEALTH CARE EDUCATION/TRAINING PROGRAM

## 2025-03-24 NOTE — PROGRESS NOTES
"Chief Complaint   Patient presents with    Routine Prenatal Visit       24 y.o., at 34w4d by Estimated Date of Delivery: 25    Complaints today: Patient doing well but feeling lots of pressure. Fetal movements "very active."     ROS  OBSTETRICS:   Contractions: n   Bleeding: n   Loss of fluid: n   Fetal movement: y  GASTRO:   Nausea: n   Vomiting: n      OB History    Para Term  AB Living   2 1 1   1   SAB IAB Ectopic Multiple Live Births       1      # Outcome Date GA Lbr Dae/2nd Weight Sex Type Anes PTL Lv   2 Current            1 Term         ADOLPH       Dating reviewed  Allergies and problem list reviewed and updated  Medical and surgical history reviewed  Prenatal labs reviewed and updated    PHYSICAL EXAM  /73   Wt 82 kg (180 lb 12.4 oz)   LMP 2024 (Approximate)     GENERAL: No acute distress  NEURO: Alert and oriented x3  PSYCH: Normal mood and affect  PULMONARY: Non-labored respiration  ABDomen: Soft, gravid, nontender    ASSESSMENT AND PLAN     Problems (from 10/21/24 to present)       Problem Noted Diagnosed Resolved    Encounter for supervision of normal pregnancy in third trimester 10/21/2024 by Rambo Andrade MD  No    History of  section 10/21/2024 by Rambo Andrade MD  No            Has growth scheduled, suspect S>D  GBS/3rds at next visit     labor precautions given  Follow-up: 2 weeks    "

## 2025-03-31 ENCOUNTER — ROUTINE PRENATAL (OUTPATIENT)
Dept: OBSTETRICS AND GYNECOLOGY | Facility: CLINIC | Age: 25
End: 2025-03-31
Payer: MEDICAID

## 2025-03-31 ENCOUNTER — LAB VISIT (OUTPATIENT)
Dept: LAB | Facility: HOSPITAL | Age: 25
End: 2025-03-31
Attending: STUDENT IN AN ORGANIZED HEALTH CARE EDUCATION/TRAINING PROGRAM
Payer: MEDICAID

## 2025-03-31 VITALS — DIASTOLIC BLOOD PRESSURE: 72 MMHG | SYSTOLIC BLOOD PRESSURE: 113 MMHG | WEIGHT: 179 LBS

## 2025-03-31 DIAGNOSIS — Z3A.35 35 WEEKS GESTATION OF PREGNANCY: Primary | ICD-10-CM

## 2025-03-31 DIAGNOSIS — Z34.83 ENCOUNTER FOR SUPERVISION OF OTHER NORMAL PREGNANCY IN THIRD TRIMESTER: ICD-10-CM

## 2025-03-31 DIAGNOSIS — Z98.891 HISTORY OF CESAREAN SECTION: ICD-10-CM

## 2025-03-31 LAB
ABSOLUTE EOSINOPHIL (OHS): 0.14 K/UL
ABSOLUTE MONOCYTE (OHS): 0.53 K/UL (ref 0.3–1)
ABSOLUTE NEUTROPHIL COUNT (OHS): 6.2 K/UL (ref 1.8–7.7)
BASOPHILS # BLD AUTO: 0.01 K/UL
BASOPHILS NFR BLD AUTO: 0.1 %
BILIRUB SERPL-MCNC: NORMAL MG/DL
BLOOD URINE, POC: NORMAL
CLARITY, POC UA: CLEAR
COLOR, POC UA: YELLOW
ERYTHROCYTE [DISTWIDTH] IN BLOOD BY AUTOMATED COUNT: 14.7 % (ref 11.5–14.5)
GLUCOSE UR QL STRIP: NORMAL
HCT VFR BLD AUTO: 32.3 % (ref 37–48.5)
HGB BLD-MCNC: 10.6 GM/DL (ref 12–16)
HIV 1+2 AB+HIV1 P24 AG SERPL QL IA: NORMAL
IMM GRANULOCYTES # BLD AUTO: 0.06 K/UL (ref 0–0.04)
IMM GRANULOCYTES NFR BLD AUTO: 0.7 % (ref 0–0.5)
KETONES UR QL STRIP: NORMAL
LEUKOCYTE ESTERASE URINE, POC: NORMAL
LYMPHOCYTES # BLD AUTO: 1.65 K/UL (ref 1–4.8)
MCH RBC QN AUTO: 28.7 PG (ref 27–31)
MCHC RBC AUTO-ENTMCNC: 32.8 G/DL (ref 32–36)
MCV RBC AUTO: 88 FL (ref 82–98)
NITRITE, POC UA: NORMAL
NUCLEATED RBC (/100WBC) (OHS): 0 /100 WBC
PH, POC UA: 6
PLATELET # BLD AUTO: 184 K/UL (ref 150–450)
PMV BLD AUTO: 10.6 FL (ref 9.2–12.9)
PROTEIN, POC: NORMAL
RBC # BLD AUTO: 3.69 M/UL (ref 4–5.4)
RELATIVE EOSINOPHIL (OHS): 1.6 %
RELATIVE LYMPHOCYTE (OHS): 19.2 % (ref 18–48)
RELATIVE MONOCYTE (OHS): 6.2 % (ref 4–15)
RELATIVE NEUTROPHIL (OHS): 72.2 % (ref 38–73)
SPECIFIC GRAVITY, POC UA: 1.02
UROBILINOGEN, POC UA: 0.2
WBC # BLD AUTO: 8.59 K/UL (ref 3.9–12.7)

## 2025-03-31 PROCEDURE — 81002 URINALYSIS NONAUTO W/O SCOPE: CPT | Mod: PBBFAC,PO | Performed by: STUDENT IN AN ORGANIZED HEALTH CARE EDUCATION/TRAINING PROGRAM

## 2025-03-31 PROCEDURE — 87081 CULTURE SCREEN ONLY: CPT | Performed by: STUDENT IN AN ORGANIZED HEALTH CARE EDUCATION/TRAINING PROGRAM

## 2025-03-31 PROCEDURE — 36415 COLL VENOUS BLD VENIPUNCTURE: CPT

## 2025-03-31 PROCEDURE — 36415 COLL VENOUS BLD VENIPUNCTURE: CPT | Performed by: STUDENT IN AN ORGANIZED HEALTH CARE EDUCATION/TRAINING PROGRAM

## 2025-03-31 PROCEDURE — 85025 COMPLETE CBC W/AUTO DIFF WBC: CPT

## 2025-03-31 PROCEDURE — 87389 HIV-1 AG W/HIV-1&-2 AB AG IA: CPT

## 2025-03-31 PROCEDURE — 99999PBSHW POCT URINE DIPSTICK WITHOUT MICROSCOPE: Mod: PBBFAC,,,

## 2025-03-31 PROCEDURE — 86593 SYPHILIS TEST NON-TREP QUANT: CPT

## 2025-03-31 PROCEDURE — 99999 PR PBB SHADOW E&M-EST. PATIENT-LVL II: CPT | Mod: PBBFAC,,, | Performed by: STUDENT IN AN ORGANIZED HEALTH CARE EDUCATION/TRAINING PROGRAM

## 2025-03-31 PROCEDURE — 99212 OFFICE O/P EST SF 10 MIN: CPT | Mod: PBBFAC,TH,PO | Performed by: STUDENT IN AN ORGANIZED HEALTH CARE EDUCATION/TRAINING PROGRAM

## 2025-03-31 NOTE — PROGRESS NOTES
Chief Complaint   Patient presents with    Routine Prenatal Visit       24 y.o., at 35w4d by Estimated Date of Delivery: 25    Complaints today: Patient doing well.     ROS  OBSTETRICS:   Contractions: n   Bleeding: n   Loss of fluid: n   Fetal movement: y  GASTRO:   Nausea: n   Vomiting: n      OB History    Para Term  AB Living   2 1 1   1   SAB IAB Ectopic Multiple Live Births       1      # Outcome Date GA Lbr Dae/2nd Weight Sex Type Anes PTL Lv   2 Current            1 Term         ADOLPH       Dating reviewed  Allergies and problem list reviewed and updated  Medical and surgical history reviewed  Prenatal labs reviewed and updated    PHYSICAL EXAM  /72   Wt 81.2 kg (179 lb 0.2 oz)   LMP 2024 (Approximate)     GENERAL: No acute distress  NEURO: Alert and oriented x3  PSYCH: Normal mood and affect  PULMONARY: Non-labored respiration  ABDomen: Soft, gravid, nontender    ASSESSMENT AND PLAN     Problems (from 10/21/24 to present)       Problem Noted Diagnosed Resolved    Encounter for supervision of normal pregnancy in third trimester 10/21/2024 by Rambo Andrade MD  No    History of  section 10/21/2024 by Rambo Andrade MD  No            Suspect S>D, growth next week  GBS/3rds today     labor precautions given  Follow-up: 1 weeks

## 2025-04-01 LAB — T PALLIDUM IGG+IGM SER QL: NORMAL

## 2025-04-02 LAB — BACTERIA SPEC AEROBE CULT: NORMAL

## 2025-04-07 ENCOUNTER — ROUTINE PRENATAL (OUTPATIENT)
Dept: OBSTETRICS AND GYNECOLOGY | Facility: CLINIC | Age: 25
End: 2025-04-07
Payer: MEDICAID

## 2025-04-07 ENCOUNTER — PROCEDURE VISIT (OUTPATIENT)
Dept: MATERNAL FETAL MEDICINE | Facility: CLINIC | Age: 25
End: 2025-04-07
Payer: MEDICAID

## 2025-04-07 VITALS — SYSTOLIC BLOOD PRESSURE: 110 MMHG | DIASTOLIC BLOOD PRESSURE: 74 MMHG | WEIGHT: 180.13 LBS

## 2025-04-07 DIAGNOSIS — Z3A.36 36 WEEKS GESTATION OF PREGNANCY: Primary | ICD-10-CM

## 2025-04-07 DIAGNOSIS — Z34.83 ENCOUNTER FOR SUPERVISION OF OTHER NORMAL PREGNANCY IN THIRD TRIMESTER: ICD-10-CM

## 2025-04-07 DIAGNOSIS — Z98.891 HISTORY OF CESAREAN SECTION: ICD-10-CM

## 2025-04-07 DIAGNOSIS — Z34.82 ENCOUNTER FOR SUPERVISION OF OTHER NORMAL PREGNANCY IN SECOND TRIMESTER: ICD-10-CM

## 2025-04-07 PROCEDURE — 99214 OFFICE O/P EST MOD 30 MIN: CPT | Mod: TH,S$PBB,, | Performed by: STUDENT IN AN ORGANIZED HEALTH CARE EDUCATION/TRAINING PROGRAM

## 2025-04-07 PROCEDURE — 99999PBSHW POCT URINE DIPSTICK WITHOUT MICROSCOPE: Mod: PBBFAC,,,

## 2025-04-07 PROCEDURE — 99999 PR PBB SHADOW E&M-EST. PATIENT-LVL II: CPT | Mod: PBBFAC,,, | Performed by: STUDENT IN AN ORGANIZED HEALTH CARE EDUCATION/TRAINING PROGRAM

## 2025-04-07 PROCEDURE — 81002 URINALYSIS NONAUTO W/O SCOPE: CPT | Mod: PBBFAC,PO | Performed by: STUDENT IN AN ORGANIZED HEALTH CARE EDUCATION/TRAINING PROGRAM

## 2025-04-07 PROCEDURE — 76816 OB US FOLLOW-UP PER FETUS: CPT | Mod: PBBFAC,PO | Performed by: OBSTETRICS & GYNECOLOGY

## 2025-04-07 PROCEDURE — 99212 OFFICE O/P EST SF 10 MIN: CPT | Mod: PBBFAC,TH,PO | Performed by: STUDENT IN AN ORGANIZED HEALTH CARE EDUCATION/TRAINING PROGRAM

## 2025-04-07 NOTE — PROGRESS NOTES
No chief complaint on file.      24 y.o., at 36w4d by Estimated Date of Delivery: 25    Complaints today: Patient doing well apart from pelvic pressure. No fatmata signs of labor. Fetal movements are active.     ROS  OBSTETRICS:   Contractions: n   Bleeding: n   Loss of fluid: n   Fetal movement: y  GASTRO:   Nausea: n   Vomiting: n      OB History    Para Term  AB Living   2 1 1   1   SAB IAB Ectopic Multiple Live Births       1      # Outcome Date GA Lbr Dae/2nd Weight Sex Type Anes PTL Lv   2 Current            1 Term         ADOLPH       Dating reviewed  Allergies and problem list reviewed and updated  Medical and surgical history reviewed  Prenatal labs reviewed and updated    PHYSICAL EXAM  /74   Wt 81.7 kg (180 lb 1.9 oz)   LMP 2024 (Approximate)     GENERAL: No acute distress  NEURO: Alert and oriented x3  PSYCH: Normal mood and affect  PULMONARY: Non-labored respiration  ABDomen: Soft, gravid, nontender    ASSESSMENT AND PLAN     Problems (from 10/21/24 to present)       Problem Noted Diagnosed Resolved    Encounter for supervision of normal pregnancy in third trimester 10/21/2024 by Rambo Andrade MD  No    History of  section 10/21/2024 by Rambo Andrade MD  No            Expectant management  Had growth scan today     labor precautions given  Follow-up: 1 week\

## 2025-04-14 ENCOUNTER — ROUTINE PRENATAL (OUTPATIENT)
Dept: OBSTETRICS AND GYNECOLOGY | Facility: CLINIC | Age: 25
End: 2025-04-14
Payer: MEDICAID

## 2025-04-14 VITALS — WEIGHT: 179.69 LBS | SYSTOLIC BLOOD PRESSURE: 109 MMHG | DIASTOLIC BLOOD PRESSURE: 73 MMHG

## 2025-04-14 DIAGNOSIS — Z34.83 ENCOUNTER FOR SUPERVISION OF OTHER NORMAL PREGNANCY IN THIRD TRIMESTER: ICD-10-CM

## 2025-04-14 DIAGNOSIS — Z98.891 HISTORY OF CESAREAN SECTION: ICD-10-CM

## 2025-04-14 DIAGNOSIS — Z3A.37 37 WEEKS GESTATION OF PREGNANCY: Primary | ICD-10-CM

## 2025-04-14 LAB
BILIRUB SERPL-MCNC: NORMAL MG/DL
BLOOD URINE, POC: NORMAL
CLARITY, POC UA: CLEAR
COLOR, POC UA: YELLOW
GLUCOSE UR QL STRIP: NORMAL
KETONES UR QL STRIP: NORMAL
LEUKOCYTE ESTERASE URINE, POC: NORMAL
NITRITE, POC UA: NORMAL
PH, POC UA: 6.5
PROTEIN, POC: NORMAL
SPECIFIC GRAVITY, POC UA: 1.01
UROBILINOGEN, POC UA: 0.2

## 2025-04-14 PROCEDURE — 99213 OFFICE O/P EST LOW 20 MIN: CPT | Mod: PBBFAC,TH,PO | Performed by: STUDENT IN AN ORGANIZED HEALTH CARE EDUCATION/TRAINING PROGRAM

## 2025-04-14 PROCEDURE — 99999 PR PBB SHADOW E&M-EST. PATIENT-LVL III: CPT | Mod: PBBFAC,,, | Performed by: STUDENT IN AN ORGANIZED HEALTH CARE EDUCATION/TRAINING PROGRAM

## 2025-04-14 PROCEDURE — 81002 URINALYSIS NONAUTO W/O SCOPE: CPT | Mod: PBBFAC,PO | Performed by: STUDENT IN AN ORGANIZED HEALTH CARE EDUCATION/TRAINING PROGRAM

## 2025-04-14 PROCEDURE — 99999PBSHW POCT URINE DIPSTICK WITHOUT MICROSCOPE: Mod: PBBFAC,,,

## 2025-04-14 NOTE — PROGRESS NOTES
Chief Complaint   Patient presents with    Routine Prenatal Visit       24 y.o., at 37w4d by Estimated Date of Delivery: 25    Complaints today: Patient doing well apart from discomforts of pregnancy.     ROS  OBSTETRICS:   Contractions: n   Bleeding: n   Loss of fluid: n   Fetal movement: y  GASTRO:   Nausea: n   Vomiting: n      OB History    Para Term  AB Living   2 1 1   1   SAB IAB Ectopic Multiple Live Births       1      # Outcome Date GA Lbr Dae/2nd Weight Sex Type Anes PTL Lv   2 Current            1 Term         ADOLPH       Dating reviewed  Allergies and problem list reviewed and updated  Medical and surgical history reviewed  Prenatal labs reviewed and updated    PHYSICAL EXAM  /73   Wt 81.5 kg (179 lb 10.8 oz)   LMP 2024 (Approximate)     GENERAL: No acute distress  NEURO: Alert and oriented x3  PSYCH: Normal mood and affect  PULMONARY: Non-labored respiration  ABDomen: Soft, gravid, nontender    ASSESSMENT AND PLAN     Problems (from 10/21/24 to present)       Problem Noted Diagnosed Resolved    Encounter for supervision of normal pregnancy in third trimester 10/21/2024 by Rambo Andrade MD  No    History of  section 10/21/2024 by Rambo Andrade MD  No            PNC up to date  RLTCS scheduled for 25 0730    Labor precautions given  Follow-up: 1 week

## 2025-04-22 ENCOUNTER — ROUTINE PRENATAL (OUTPATIENT)
Dept: OBSTETRICS AND GYNECOLOGY | Facility: CLINIC | Age: 25
End: 2025-04-22
Payer: MEDICAID

## 2025-04-22 VITALS — WEIGHT: 184.06 LBS | SYSTOLIC BLOOD PRESSURE: 112 MMHG | DIASTOLIC BLOOD PRESSURE: 75 MMHG

## 2025-04-22 DIAGNOSIS — Z3A.38 38 WEEKS GESTATION OF PREGNANCY: Primary | ICD-10-CM

## 2025-04-22 DIAGNOSIS — Z98.891 HISTORY OF CESAREAN SECTION: ICD-10-CM

## 2025-04-22 PROCEDURE — 99212 OFFICE O/P EST SF 10 MIN: CPT | Mod: PBBFAC,PO | Performed by: OBSTETRICS & GYNECOLOGY

## 2025-04-22 PROCEDURE — 99213 OFFICE O/P EST LOW 20 MIN: CPT | Mod: TH,S$PBB,, | Performed by: OBSTETRICS & GYNECOLOGY

## 2025-04-22 PROCEDURE — 99999 PR PBB SHADOW E&M-EST. PATIENT-LVL II: CPT | Mod: PBBFAC,,, | Performed by: OBSTETRICS & GYNECOLOGY

## 2025-04-22 NOTE — PROGRESS NOTES
Chief Complaint   Patient presents with    Routine Prenatal Visit     38 weeks gestation of pregnancy       24 y.o., at 37w4d by Estimated Date of Delivery: 25    Complaints today: Patient doing well , having some contractions but only every hour    ROS  OBSTETRICS:   Contractions: y, every 1-2 hours    Bleeding: n   Loss of fluid: n   Fetal movement: y  GASTRO:   Nausea: n   Vomiting: n      OB History    Para Term  AB Living   2 1 1   1   SAB IAB Ectopic Multiple Live Births       1      # Outcome Date GA Lbr Dae/2nd Weight Sex Type Anes PTL Lv   2 Current            1 Term         ADOLPH       Dating reviewed  Allergies and problem list reviewed and updated  Medical and surgical history reviewed  Prenatal labs reviewed and updated    PHYSICAL EXAM  /75   Wt 83.5 kg (184 lb 1.4 oz)   LMP 2024 (Approximate)     GENERAL: No acute distress  NEURO: Alert and oriented x3  PSYCH: Normal mood and affect  PULMONARY: Non-labored respiration  ABDomen: Soft, gravid, nontender    Cervix closed, obvious constipation     ASSESSMENT AND PLAN     Problems (from 10/21/24 to present)       Problem Noted Diagnosed Resolved    Encounter for supervision of normal pregnancy in third trimester 10/21/2024 by Rambo Andrade MD  No    History of  section 10/21/2024 by Rambo Andrade MD  No            PNC up to date  RLTCS scheduled for 25 0730  Reviewed where to go, timing, hebiclens given. Discussed medications for constipation   Will get bandage appt set up     Face to Face time with patient: 20 minutes of total time spent on the encounter, which includes face to face time and non-face to face time preparing to see the patient (eg, review of tests), Obtaining and/or reviewing separately obtained history, Documenting clinical information in the electronic or other health record, Independently interpreting results (not separately reported) and communicating results to the  patient/family/caregiver, or Care coordination (not separately reported).

## 2025-04-25 ENCOUNTER — ANESTHESIA EVENT (OUTPATIENT)
Dept: OBSTETRICS AND GYNECOLOGY | Facility: HOSPITAL | Age: 25
End: 2025-04-25
Payer: MEDICAID

## 2025-04-26 NOTE — ANESTHESIA PREPROCEDURE EVALUATION
" Milagro Botello is a 24 y.o. female     OB History    Para Term  AB Living   2 1 1   1   SAB IAB Ectopic Multiple Live Births       1      # Outcome Date GA Lbr Dae/2nd Weight Sex Type Anes PTL Lv   2 Current            1 Term         ADOLPH       Wt Readings from Last 1 Encounters:   25 1343 83.5 kg (184 lb 1.4 oz)       BP Readings from Last 3 Encounters:   25 112/75   25 109/73   25 110/74       Problem List[1]    Past Surgical History:   Procedure Laterality Date    OVARIAN CYST SURGERY Left        Social History[2]      Chemistry        Component Value Date/Time     2024 1729     2020 1627    K 3.5 (L) 2024 1729    K 3.3 (L) 2020 1627     2020 1627    CO2 23 2024 1729    CO2 23 2020 1627    BUN 7.0 2024 1729    BUN 7 2020 1627    CREATININE 0.43 (L) 2024 1729    CREATININE 0.7 2020 1627    GLU 82 2020 1627        Component Value Date/Time    CALCIUM 8.6 2024 1729    CALCIUM 8.9 2020 1627    ALKPHOS 61 2020 1627    AST 18 2024 1729    AST 19 2020 1627    ALT 22 (L) 2024 1729    ALT 12 2020 1627    BILITOT 0.2 2024 1729    BILITOT 0.1 2020 1627    ESTGFRAFRICA >60 2020 1627    EGFRNONAA >60 2020 1627            Lab Results   Component Value Date    WBC 8.59 2025    HGB 10.6 (L) 2025    HCT 32.3 (L) 2025    MCV 88 2025     2025         OB US 2025: "1. Fetal anatomy was again surveyed today. While no fetal structural anomalies are identified on sufficiently visualized structures, views remain suboptimal. No findings   raise concern for underlying anomaly that would modify mode or location of delivery.  US is incompletely sensitive to detect all congenital anomalies.   2. Fetal size is AGA with the EFW at the 43%.   3. AFV is normal.   4. Placenta site is posterior, " "fundal without evidence of previa. "           Pre-op Assessment    I have reviewed the Patient Summary Reports.     I have reviewed the Nursing Notes. I have reviewed the NPO Status.   I have reviewed the Medications.     Review of Systems  Anesthesia Hx:  No problems with previous Anesthesia   History of prior surgery of interest to airway management or planning:            Denies Personal Hx of Anesthesia complications.                    Social:  No Alcohol Use, Non-Smoker       Cardiovascular:  Exercise tolerance: good    Denies Hypertension.                                          Pulmonary:     Denies Asthma.                    Hepatic/GI:     GERD                OB/GYN/PEDS:  States first C/S due to PCOS            Neurological:  Neurology Normal Denies TIA.  Denies CVA.    Denies Seizures.                                Endocrine:  Denies Diabetes. Denies Hypothyroidism.  Denies Hyperthyroidism.       Obesity / BMI > 30      Physical Exam  General: Well nourished, Cooperative, Alert and Oriented    Airway:  Mallampati: III   Mouth Opening: Small, but > 3cm  TM Distance: < 4 cm  Tongue: Normal  Neck: Girth Increased    Dental:        Anesthesia Plan  Type of Anesthesia, risks & benefits discussed:    Anesthesia Type: Epidural, Spinal, CSE  Intra-op Monitoring Plan: Standard ASA Monitors  Post Op Pain Control Plan: multimodal analgesia, IV/PO Opioids PRN, epidural analgesia and intrathecal opioid  Induction:  IV  Informed Consent: Informed consent signed with the Patient and all parties understand the risks and agree with anesthesia plan.  All questions answered. Consent completed with  assistancePatient consented to blood products? Yes  ASA Score: 2  Day of Surgery Review of History & Physical: H&P Update referred to the surgeon/provider.  Anesthesia Plan Notes:  # [id number] used for history and consent discussion.     Ready For Surgery From Anesthesia Perspective.     .           [1] "   Patient Active Problem List  Diagnosis    Encounter for supervision of normal pregnancy in third trimester    History of  section   [2]   Social History  Socioeconomic History    Marital status: Single   Tobacco Use    Smoking status: Never     Passive exposure: Never    Smokeless tobacco: Never   Substance and Sexual Activity    Alcohol use: Not Currently    Drug use: Never    Sexual activity: Yes     Partners: Male

## 2025-04-28 ENCOUNTER — HOSPITAL ENCOUNTER (INPATIENT)
Facility: HOSPITAL | Age: 25
LOS: 2 days | Discharge: HOME | End: 2025-04-30
Attending: STUDENT IN AN ORGANIZED HEALTH CARE EDUCATION/TRAINING PROGRAM | Admitting: STUDENT IN AN ORGANIZED HEALTH CARE EDUCATION/TRAINING PROGRAM
Payer: MEDICAID

## 2025-04-28 ENCOUNTER — ANESTHESIA (OUTPATIENT)
Dept: OBSTETRICS AND GYNECOLOGY | Facility: HOSPITAL | Age: 25
End: 2025-04-28
Payer: MEDICAID

## 2025-04-28 DIAGNOSIS — Z3A.39 39 WEEKS GESTATION OF PREGNANCY: ICD-10-CM

## 2025-04-28 DIAGNOSIS — Z98.891 HISTORY OF CESAREAN SECTION: ICD-10-CM

## 2025-04-28 LAB
ABSOLUTE EOSINOPHIL (OHS): 0.16 K/UL
ABSOLUTE MONOCYTE (OHS): 0.56 K/UL (ref 0.3–1)
ABSOLUTE NEUTROPHIL COUNT (OHS): 5.85 K/UL (ref 1.8–7.7)
BASOPHILS # BLD AUTO: 0.01 K/UL
BASOPHILS NFR BLD AUTO: 0.1 %
ERYTHROCYTE [DISTWIDTH] IN BLOOD BY AUTOMATED COUNT: 14.6 % (ref 11.5–14.5)
GROUP & RH: NORMAL
HCT VFR BLD AUTO: 31.6 % (ref 37–48.5)
HGB BLD-MCNC: 10.5 GM/DL (ref 12–16)
IMM GRANULOCYTES # BLD AUTO: 0.04 K/UL (ref 0–0.04)
IMM GRANULOCYTES NFR BLD AUTO: 0.5 % (ref 0–0.5)
INDIRECT COOMBS: NORMAL
LYMPHOCYTES # BLD AUTO: 1.97 K/UL (ref 1–4.8)
MCH RBC QN AUTO: 28.8 PG (ref 27–31)
MCHC RBC AUTO-ENTMCNC: 33.2 G/DL (ref 32–36)
MCV RBC AUTO: 87 FL (ref 82–98)
NUCLEATED RBC (/100WBC) (OHS): 0 /100 WBC
PLATELET # BLD AUTO: 167 K/UL (ref 150–450)
PMV BLD AUTO: 10.5 FL (ref 9.2–12.9)
RBC # BLD AUTO: 3.64 M/UL (ref 4–5.4)
RELATIVE EOSINOPHIL (OHS): 1.9 %
RELATIVE LYMPHOCYTE (OHS): 22.9 % (ref 18–48)
RELATIVE MONOCYTE (OHS): 6.5 % (ref 4–15)
RELATIVE NEUTROPHIL (OHS): 68.1 % (ref 38–73)
T PALLIDUM IGG+IGM SER QL: NORMAL
WBC # BLD AUTO: 8.59 K/UL (ref 3.9–12.7)

## 2025-04-28 PROCEDURE — 25000003 PHARM REV CODE 250: Performed by: STUDENT IN AN ORGANIZED HEALTH CARE EDUCATION/TRAINING PROGRAM

## 2025-04-28 PROCEDURE — 51702 INSERT TEMP BLADDER CATH: CPT

## 2025-04-28 PROCEDURE — 37000008 HC ANESTHESIA 1ST 15 MINUTES: Performed by: STUDENT IN AN ORGANIZED HEALTH CARE EDUCATION/TRAINING PROGRAM

## 2025-04-28 PROCEDURE — 11000001 HC ACUTE MED/SURG PRIVATE ROOM

## 2025-04-28 PROCEDURE — 86593 SYPHILIS TEST NON-TREP QUANT: CPT | Performed by: STUDENT IN AN ORGANIZED HEALTH CARE EDUCATION/TRAINING PROGRAM

## 2025-04-28 PROCEDURE — 71000039 HC RECOVERY, EACH ADD'L HOUR: Performed by: STUDENT IN AN ORGANIZED HEALTH CARE EDUCATION/TRAINING PROGRAM

## 2025-04-28 PROCEDURE — 63600175 PHARM REV CODE 636 W HCPCS: Performed by: STUDENT IN AN ORGANIZED HEALTH CARE EDUCATION/TRAINING PROGRAM

## 2025-04-28 PROCEDURE — 36415 COLL VENOUS BLD VENIPUNCTURE: CPT | Performed by: STUDENT IN AN ORGANIZED HEALTH CARE EDUCATION/TRAINING PROGRAM

## 2025-04-28 PROCEDURE — 36004725 HC OB OR TIME LEV III - EA ADD 15 MIN: Performed by: STUDENT IN AN ORGANIZED HEALTH CARE EDUCATION/TRAINING PROGRAM

## 2025-04-28 PROCEDURE — 85025 COMPLETE CBC W/AUTO DIFF WBC: CPT | Performed by: STUDENT IN AN ORGANIZED HEALTH CARE EDUCATION/TRAINING PROGRAM

## 2025-04-28 PROCEDURE — 99900035 HC TECH TIME PER 15 MIN (STAT)

## 2025-04-28 PROCEDURE — 37000009 HC ANESTHESIA EA ADD 15 MINS: Performed by: STUDENT IN AN ORGANIZED HEALTH CARE EDUCATION/TRAINING PROGRAM

## 2025-04-28 PROCEDURE — 36004724 HC OB OR TIME LEV III - 1ST 15 MIN: Performed by: STUDENT IN AN ORGANIZED HEALTH CARE EDUCATION/TRAINING PROGRAM

## 2025-04-28 PROCEDURE — 59514 CESAREAN DELIVERY ONLY: CPT | Mod: GB,,, | Performed by: STUDENT IN AN ORGANIZED HEALTH CARE EDUCATION/TRAINING PROGRAM

## 2025-04-28 PROCEDURE — 63600175 PHARM REV CODE 636 W HCPCS: Mod: JZ,TB | Performed by: STUDENT IN AN ORGANIZED HEALTH CARE EDUCATION/TRAINING PROGRAM

## 2025-04-28 PROCEDURE — 86900 BLOOD TYPING SEROLOGIC ABO: CPT | Performed by: STUDENT IN AN ORGANIZED HEALTH CARE EDUCATION/TRAINING PROGRAM

## 2025-04-28 PROCEDURE — 71000033 HC RECOVERY, INTIAL HOUR: Performed by: STUDENT IN AN ORGANIZED HEALTH CARE EDUCATION/TRAINING PROGRAM

## 2025-04-28 RX ORDER — KETOROLAC TROMETHAMINE 30 MG/ML
30 INJECTION, SOLUTION INTRAMUSCULAR; INTRAVENOUS EVERY 6 HOURS
Status: DISCONTINUED | OUTPATIENT
Start: 2025-04-29 | End: 2025-04-28

## 2025-04-28 RX ORDER — DIPHENHYDRAMINE HCL 25 MG
25 CAPSULE ORAL EVERY 4 HOURS PRN
Status: DISCONTINUED | OUTPATIENT
Start: 2025-04-28 | End: 2025-04-30 | Stop reason: HOSPADM

## 2025-04-28 RX ORDER — PHENYLEPHRINE HYDROCHLORIDE 10 MG/ML
INJECTION INTRAVENOUS
Status: DISCONTINUED | OUTPATIENT
Start: 2025-04-28 | End: 2025-04-28

## 2025-04-28 RX ORDER — CARBOPROST TROMETHAMINE 250 UG/ML
250 INJECTION, SOLUTION INTRAMUSCULAR
Status: DISCONTINUED | OUTPATIENT
Start: 2025-04-28 | End: 2025-04-28

## 2025-04-28 RX ORDER — ONDANSETRON HYDROCHLORIDE 2 MG/ML
4 INJECTION, SOLUTION INTRAVENOUS EVERY 6 HOURS PRN
Status: ACTIVE | OUTPATIENT
Start: 2025-04-28 | End: 2025-04-29

## 2025-04-28 RX ORDER — BISACODYL 10 MG/1
10 SUPPOSITORY RECTAL ONCE AS NEEDED
Status: DISCONTINUED | OUTPATIENT
Start: 2025-04-28 | End: 2025-04-30 | Stop reason: HOSPADM

## 2025-04-28 RX ORDER — PRENATAL WITH FERROUS FUM AND FOLIC ACID 3080; 920; 120; 400; 22; 1.84; 3; 20; 10; 1; 12; 200; 27; 25; 2 [IU]/1; [IU]/1; MG/1; [IU]/1; MG/1; MG/1; MG/1; MG/1; MG/1; MG/1; UG/1; MG/1; MG/1; MG/1; MG/1
1 TABLET ORAL DAILY
Status: DISCONTINUED | OUTPATIENT
Start: 2025-04-28 | End: 2025-04-30 | Stop reason: HOSPADM

## 2025-04-28 RX ORDER — ADHESIVE BANDAGE
30 BANDAGE TOPICAL 2 TIMES DAILY PRN
Status: DISCONTINUED | OUTPATIENT
Start: 2025-04-29 | End: 2025-04-30 | Stop reason: HOSPADM

## 2025-04-28 RX ORDER — OXYTOCIN 10 [USP'U]/ML
10 INJECTION, SOLUTION INTRAMUSCULAR; INTRAVENOUS ONCE AS NEEDED
Status: DISCONTINUED | OUTPATIENT
Start: 2025-04-28 | End: 2025-04-30 | Stop reason: HOSPADM

## 2025-04-28 RX ORDER — IBUPROFEN 600 MG/1
600 TABLET ORAL EVERY 6 HOURS
Status: DISCONTINUED | OUTPATIENT
Start: 2025-04-29 | End: 2025-04-28

## 2025-04-28 RX ORDER — SODIUM CITRATE AND CITRIC ACID MONOHYDRATE 334; 500 MG/5ML; MG/5ML
30 SOLUTION ORAL
Status: DISCONTINUED | OUTPATIENT
Start: 2025-04-28 | End: 2025-04-28

## 2025-04-28 RX ORDER — OXYTOCIN-SODIUM CHLORIDE 0.9% IV SOLN 30 UNIT/500ML 30-0.9/5 UT/ML-%
95 SOLUTION INTRAVENOUS ONCE AS NEEDED
Status: DISCONTINUED | OUTPATIENT
Start: 2025-04-28 | End: 2025-04-30 | Stop reason: HOSPADM

## 2025-04-28 RX ORDER — KETOROLAC TROMETHAMINE 30 MG/ML
30 INJECTION, SOLUTION INTRAMUSCULAR; INTRAVENOUS EVERY 6 HOURS
Status: DISPENSED | OUTPATIENT
Start: 2025-04-28 | End: 2025-04-29

## 2025-04-28 RX ORDER — OXYTOCIN-SODIUM CHLORIDE 0.9% IV SOLN 30 UNIT/500ML 30-0.9/5 UT/ML-%
10 SOLUTION INTRAVENOUS ONCE AS NEEDED
Status: COMPLETED | OUTPATIENT
Start: 2025-04-28 | End: 2025-04-28

## 2025-04-28 RX ORDER — DIPHENHYDRAMINE HYDROCHLORIDE 50 MG/ML
12.5 INJECTION, SOLUTION INTRAMUSCULAR; INTRAVENOUS NIGHTLY PRN
Status: DISCONTINUED | OUTPATIENT
Start: 2025-04-28 | End: 2025-04-30 | Stop reason: HOSPADM

## 2025-04-28 RX ORDER — DOCUSATE SODIUM 100 MG/1
200 CAPSULE, LIQUID FILLED ORAL 2 TIMES DAILY
Status: DISCONTINUED | OUTPATIENT
Start: 2025-04-28 | End: 2025-04-30 | Stop reason: HOSPADM

## 2025-04-28 RX ORDER — ACETAMINOPHEN 325 MG/1
650 TABLET ORAL EVERY 6 HOURS
Status: DISPENSED | OUTPATIENT
Start: 2025-04-28 | End: 2025-04-29

## 2025-04-28 RX ORDER — AMOXICILLIN 250 MG
1 CAPSULE ORAL NIGHTLY PRN
Status: DISCONTINUED | OUTPATIENT
Start: 2025-04-28 | End: 2025-04-30 | Stop reason: HOSPADM

## 2025-04-28 RX ORDER — MISOPROSTOL 200 UG/1
800 TABLET ORAL ONCE AS NEEDED
Status: DISCONTINUED | OUTPATIENT
Start: 2025-04-28 | End: 2025-04-30 | Stop reason: HOSPADM

## 2025-04-28 RX ORDER — ACETAMINOPHEN 10 MG/ML
INJECTION, SOLUTION INTRAVENOUS
Status: DISCONTINUED | OUTPATIENT
Start: 2025-04-28 | End: 2025-04-28

## 2025-04-28 RX ORDER — OXYCODONE HYDROCHLORIDE 5 MG/1
5 TABLET ORAL EVERY 4 HOURS PRN
Status: ACTIVE | OUTPATIENT
Start: 2025-04-28 | End: 2025-04-29

## 2025-04-28 RX ORDER — METHYLERGONOVINE MALEATE 0.2 MG/ML
200 INJECTION INTRAVENOUS
Status: DISCONTINUED | OUTPATIENT
Start: 2025-04-28 | End: 2025-04-28

## 2025-04-28 RX ORDER — CEFAZOLIN 2 G/1
2 INJECTION, POWDER, FOR SOLUTION INTRAMUSCULAR; INTRAVENOUS
Status: COMPLETED | OUTPATIENT
Start: 2025-04-28 | End: 2025-04-28

## 2025-04-28 RX ORDER — ONDANSETRON 8 MG/1
8 TABLET, ORALLY DISINTEGRATING ORAL EVERY 8 HOURS PRN
Status: DISCONTINUED | OUTPATIENT
Start: 2025-04-28 | End: 2025-04-30 | Stop reason: HOSPADM

## 2025-04-28 RX ORDER — ONDANSETRON HYDROCHLORIDE 2 MG/ML
INJECTION, SOLUTION INTRAVENOUS
Status: DISCONTINUED | OUTPATIENT
Start: 2025-04-28 | End: 2025-04-28

## 2025-04-28 RX ORDER — MISOPROSTOL 200 UG/1
800 TABLET ORAL
Status: DISCONTINUED | OUTPATIENT
Start: 2025-04-28 | End: 2025-04-28

## 2025-04-28 RX ORDER — IBUPROFEN 600 MG/1
600 TABLET ORAL EVERY 6 HOURS
Status: DISCONTINUED | OUTPATIENT
Start: 2025-04-29 | End: 2025-04-30 | Stop reason: HOSPADM

## 2025-04-28 RX ORDER — SODIUM CHLORIDE, SODIUM LACTATE, POTASSIUM CHLORIDE, CALCIUM CHLORIDE 600; 310; 30; 20 MG/100ML; MG/100ML; MG/100ML; MG/100ML
INJECTION, SOLUTION INTRAVENOUS CONTINUOUS
Status: DISCONTINUED | OUTPATIENT
Start: 2025-04-28 | End: 2025-04-28

## 2025-04-28 RX ORDER — LIDOCAINE HYDROCHLORIDE 10 MG/ML
INJECTION, SOLUTION EPIDURAL; INFILTRATION; INTRACAUDAL; PERINEURAL
Status: COMPLETED | OUTPATIENT
Start: 2025-04-28 | End: 2025-04-28

## 2025-04-28 RX ORDER — CARBOPROST TROMETHAMINE 250 UG/ML
250 INJECTION, SOLUTION INTRAMUSCULAR
Status: DISCONTINUED | OUTPATIENT
Start: 2025-04-28 | End: 2025-04-30 | Stop reason: HOSPADM

## 2025-04-28 RX ORDER — MUPIROCIN 20 MG/G
OINTMENT TOPICAL
Status: DISCONTINUED | OUTPATIENT
Start: 2025-04-28 | End: 2025-04-28

## 2025-04-28 RX ORDER — DEXAMETHASONE SODIUM PHOSPHATE 4 MG/ML
INJECTION, SOLUTION INTRA-ARTICULAR; INTRALESIONAL; INTRAMUSCULAR; INTRAVENOUS; SOFT TISSUE
Status: DISCONTINUED | OUTPATIENT
Start: 2025-04-28 | End: 2025-04-28

## 2025-04-28 RX ORDER — METHYLERGONOVINE MALEATE 0.2 MG/ML
200 INJECTION INTRAVENOUS ONCE AS NEEDED
Status: DISCONTINUED | OUTPATIENT
Start: 2025-04-28 | End: 2025-04-30 | Stop reason: HOSPADM

## 2025-04-28 RX ORDER — DIPHENOXYLATE HYDROCHLORIDE AND ATROPINE SULFATE 2.5; .025 MG/1; MG/1
2 TABLET ORAL EVERY 6 HOURS PRN
Status: DISCONTINUED | OUTPATIENT
Start: 2025-04-28 | End: 2025-04-30 | Stop reason: HOSPADM

## 2025-04-28 RX ORDER — OXYCODONE AND ACETAMINOPHEN 10; 325 MG/1; MG/1
1 TABLET ORAL EVERY 4 HOURS PRN
Status: DISCONTINUED | OUTPATIENT
Start: 2025-04-28 | End: 2025-04-30 | Stop reason: HOSPADM

## 2025-04-28 RX ORDER — MORPHINE SULFATE 0.5 MG/ML
INJECTION, SOLUTION EPIDURAL; INTRATHECAL; INTRAVENOUS
Status: DISCONTINUED | OUTPATIENT
Start: 2025-04-28 | End: 2025-04-28

## 2025-04-28 RX ORDER — HYDROMORPHONE HYDROCHLORIDE 1 MG/ML
0.4 INJECTION, SOLUTION INTRAMUSCULAR; INTRAVENOUS; SUBCUTANEOUS
OUTPATIENT
Start: 2025-04-28 | End: 2025-04-29

## 2025-04-28 RX ORDER — PROCHLORPERAZINE EDISYLATE 5 MG/ML
5 INJECTION INTRAMUSCULAR; INTRAVENOUS EVERY 6 HOURS PRN
Status: DISCONTINUED | OUTPATIENT
Start: 2025-04-28 | End: 2025-04-30 | Stop reason: HOSPADM

## 2025-04-28 RX ORDER — SODIUM CHLORIDE 0.9 % (FLUSH) 0.9 %
10 SYRINGE (ML) INJECTION
Status: DISCONTINUED | OUTPATIENT
Start: 2025-04-28 | End: 2025-04-30 | Stop reason: HOSPADM

## 2025-04-28 RX ORDER — OXYTOCIN-SODIUM CHLORIDE 0.9% IV SOLN 30 UNIT/500ML 30-0.9/5 UT/ML-%
95 SOLUTION INTRAVENOUS CONTINUOUS PRN
Status: DISCONTINUED | OUTPATIENT
Start: 2025-04-28 | End: 2025-04-28

## 2025-04-28 RX ORDER — BUPIVACAINE HYDROCHLORIDE 7.5 MG/ML
INJECTION INTRAVENOUS
Status: DISCONTINUED | OUTPATIENT
Start: 2025-04-28 | End: 2025-04-28

## 2025-04-28 RX ORDER — OXYTOCIN-SODIUM CHLORIDE 0.9% IV SOLN 30 UNIT/500ML 30-0.9/5 UT/ML-%
95 SOLUTION INTRAVENOUS CONTINUOUS PRN
Status: DISCONTINUED | OUTPATIENT
Start: 2025-04-28 | End: 2025-04-30 | Stop reason: HOSPADM

## 2025-04-28 RX ORDER — FENTANYL CITRATE 50 UG/ML
INJECTION, SOLUTION INTRAMUSCULAR; INTRAVENOUS
Status: DISCONTINUED | OUTPATIENT
Start: 2025-04-28 | End: 2025-04-28

## 2025-04-28 RX ORDER — IBUPROFEN 600 MG/1
600 TABLET ORAL EVERY 6 HOURS
Status: DISCONTINUED | OUTPATIENT
Start: 2025-04-30 | End: 2025-04-28

## 2025-04-28 RX ORDER — OXYCODONE HYDROCHLORIDE 10 MG/1
10 TABLET ORAL EVERY 4 HOURS PRN
Status: DISPENSED | OUTPATIENT
Start: 2025-04-28 | End: 2025-04-29

## 2025-04-28 RX ORDER — MUPIROCIN 20 MG/G
OINTMENT TOPICAL 2 TIMES DAILY
Status: DISCONTINUED | OUTPATIENT
Start: 2025-04-28 | End: 2025-04-30 | Stop reason: HOSPADM

## 2025-04-28 RX ORDER — FAMOTIDINE 10 MG/ML
20 INJECTION, SOLUTION INTRAVENOUS
Status: DISCONTINUED | OUTPATIENT
Start: 2025-04-28 | End: 2025-04-28

## 2025-04-28 RX ORDER — ACETAMINOPHEN 325 MG/1
650 TABLET ORAL EVERY 6 HOURS
Status: DISCONTINUED | OUTPATIENT
Start: 2025-04-29 | End: 2025-04-28

## 2025-04-28 RX ORDER — OXYTOCIN-SODIUM CHLORIDE 0.9% IV SOLN 30 UNIT/500ML 30-0.9/5 UT/ML-%
10 SOLUTION INTRAVENOUS ONCE AS NEEDED
Status: DISCONTINUED | OUTPATIENT
Start: 2025-04-28 | End: 2025-04-30 | Stop reason: HOSPADM

## 2025-04-28 RX ORDER — SIMETHICONE 80 MG
1 TABLET,CHEWABLE ORAL EVERY 6 HOURS PRN
Status: DISCONTINUED | OUTPATIENT
Start: 2025-04-28 | End: 2025-04-30 | Stop reason: HOSPADM

## 2025-04-28 RX ORDER — NALBUPHINE HYDROCHLORIDE 10 MG/ML
2.5 INJECTION INTRAMUSCULAR; INTRAVENOUS; SUBCUTANEOUS ONCE
Status: DISCONTINUED | OUTPATIENT
Start: 2025-04-28 | End: 2025-04-30 | Stop reason: HOSPADM

## 2025-04-28 RX ORDER — OXYCODONE AND ACETAMINOPHEN 5; 325 MG/1; MG/1
1 TABLET ORAL EVERY 4 HOURS PRN
Status: DISCONTINUED | OUTPATIENT
Start: 2025-04-28 | End: 2025-04-28 | Stop reason: SDUPTHER

## 2025-04-28 RX ORDER — KETOROLAC TROMETHAMINE 30 MG/ML
INJECTION, SOLUTION INTRAMUSCULAR; INTRAVENOUS
Status: DISCONTINUED | OUTPATIENT
Start: 2025-04-28 | End: 2025-04-28

## 2025-04-28 RX ADMIN — DEXAMETHASONE SODIUM PHOSPHATE 8 MG: 4 INJECTION, SOLUTION INTRA-ARTICULAR; INTRALESIONAL; INTRAMUSCULAR; INTRAVENOUS; SOFT TISSUE at 08:04

## 2025-04-28 RX ADMIN — MUPIROCIN: 20 OINTMENT TOPICAL at 08:04

## 2025-04-28 RX ADMIN — FAMOTIDINE 20 MG: 10 INJECTION, SOLUTION INTRAVENOUS at 06:04

## 2025-04-28 RX ADMIN — DOCUSATE SODIUM 200 MG: 100 CAPSULE, LIQUID FILLED ORAL at 08:04

## 2025-04-28 RX ADMIN — FENTANYL CITRATE 15 MCG: 50 INJECTION INTRAMUSCULAR; INTRAVENOUS at 07:04

## 2025-04-28 RX ADMIN — KETOROLAC TROMETHAMINE 30 MG: 30 INJECTION, SOLUTION INTRAMUSCULAR; INTRAVENOUS at 08:04

## 2025-04-28 RX ADMIN — CEFAZOLIN 2 G: 2 INJECTION, POWDER, FOR SOLUTION INTRAMUSCULAR; INTRAVENOUS at 08:04

## 2025-04-28 RX ADMIN — OXYCODONE HYDROCHLORIDE 10 MG: 10 TABLET ORAL at 08:04

## 2025-04-28 RX ADMIN — PHENYLEPHRINE HYDROCHLORIDE 0.4 MCG/KG/MIN: 10 INJECTION INTRAVENOUS at 07:04

## 2025-04-28 RX ADMIN — PHENYLEPHRINE HYDROCHLORIDE 200 MCG: 10 INJECTION INTRAVENOUS at 08:04

## 2025-04-28 RX ADMIN — ACETAMINOPHEN 1000 MG: 10 INJECTION, SOLUTION INTRAVENOUS at 08:04

## 2025-04-28 RX ADMIN — PHENYLEPHRINE HYDROCHLORIDE 150 MCG: 10 INJECTION INTRAVENOUS at 08:04

## 2025-04-28 RX ADMIN — ACETAMINOPHEN 650 MG: 325 TABLET ORAL at 08:04

## 2025-04-28 RX ADMIN — LIDOCAINE HYDROCHLORIDE 30 MG: 10 INJECTION, SOLUTION EPIDURAL; INFILTRATION; INTRACAUDAL; PERINEURAL at 07:04

## 2025-04-28 RX ADMIN — MUPIROCIN: 20 OINTMENT TOPICAL at 06:04

## 2025-04-28 RX ADMIN — BUPIVACAINE HYDROCHLORIDE IN DEXTROSE 1.5 ML: 7.5 INJECTION, SOLUTION SUBARACHNOID at 07:04

## 2025-04-28 RX ADMIN — ONDANSETRON 4 MG: 2 INJECTION, SOLUTION INTRAMUSCULAR; INTRAVENOUS at 07:04

## 2025-04-28 RX ADMIN — MORPHINE SULFATE 0.15 MG: 0.5 INJECTION, SOLUTION EPIDURAL; INTRATHECAL; INTRAVENOUS at 07:04

## 2025-04-28 RX ADMIN — SODIUM CHLORIDE, SODIUM LACTATE, POTASSIUM CHLORIDE, AND CALCIUM CHLORIDE: .6; .31; .03; .02 INJECTION, SOLUTION INTRAVENOUS at 07:04

## 2025-04-28 RX ADMIN — Medication 500 ML/HR: at 08:04

## 2025-04-28 NOTE — ANESTHESIA PROCEDURE NOTES
"Spinal    Diagnosis: Intrauterine pregnancy  Patient location during procedure: OR  Start time: 4/28/2025 7:43 AM  Timeout: 4/28/2025 7:40 AM  End time: 4/28/2025 7:50 AM    Staffing  Authorizing Provider: Reynaldo Fermin MD  Performing Provider: Reynaldo Fermin MD    Staffing  Performed by: Reynaldo Fermin MD  Authorized by: Reynaldo Fermin MD    Preanesthetic Checklist  Completed: patient identified, IV checked, risks and benefits discussed, surgical consent, monitors and equipment checked, pre-op evaluation and timeout performed  Spinal Block  Patient position: sitting  Prep: ChloraPrep  Patient monitoring: heart rate and frequent blood pressure checks  Approach: midline  Location: L3-4  Injection technique: single shot  CSF Fluid: clear free-flowing CSF  Needle  Needle type: pencil-tip   Needle gauge: 24 G  Needle length: 5 in  Additional Documentation: incremental injection, negative aspiration for heme and no paresthesia on injection  Needle localization: anatomical landmarks  Assessment  Sensory level: T5   Dermatomal levels determined by pinch or prick  Ease of block: easy  Patient's tolerance of the procedure: comfortable throughout block  Additional Notes    First attempt w/ pencan 25 G 3.5 " and then I asked for the Lu Cosme 24 G 5" needle.   Medications:    Medications: lidocaine (PF) injection 1% - Other   30 mg - 4/28/2025 7:43:00 AM          "

## 2025-04-28 NOTE — TRANSFER OF CARE
Anesthesia Transfer of Care Note    Patient: Milagro Botello    Procedure(s) Performed: Procedure(s) (LRB):   SECTION (N/A)    Patient location: Labor and Delivery    Anesthesia Type: spinal    Transport from OR: Transported from OR on room air with adequate spontaneous ventilation    Post pain: adequate analgesia    Post assessment: no apparent anesthetic complications    Post vital signs: stable    Level of consciousness: awake, alert and oriented    Nausea/Vomiting: no nausea/vomiting    Complications: none    Transfer of care protocol handoff checklist not completed for medical reasons

## 2025-04-28 NOTE — NURSING
Pereira emptied of 500 ml of clear urine.  Pads changed. Patient rolled to room 304. Report given to Vangie APPLE on mother baby. Fundus firm at the umbilicus midline without massage. Lochia rubra scant. Saline lock on the left thumb.

## 2025-04-28 NOTE — L&D DELIVERY NOTE
[facility] - Labor & Delivery   Section   Operative Note    SUMMARY     Date of Procedure: 2025     Procedure: Procedure(s) (LRB):   SECTION (N/A)    Surgeons and Role:     * Rambo Andrade MD - Primary    Assisting Surgeon: None    Pre-Operative Diagnosis: History of  section [Z98.891]    Post-Operative Diagnosis: Post-Op Diagnosis Codes:     * History of  section [Z98.891]    Anesthesia: Spinal/Epidural    Technical Procedures Used: Repeat low transverse  delivery           Description of the Findings of the Procedure:   Normal external genitalia  Single viable female  in vertex presentation  Dense peritoneal adhesions of right adnexa, encapsulating the right ovary. Normal appearing left adnexa and uterus  Excellent hemostasis at the conclusion of the case    Complications: No    Blood Loss: 560 cc     Procedure Details   The patient was seen in the Holding Room. The risks, benefits, complications, treatment options, and expected outcomes were discussed with the patient.  The patient concurred with the proposed plan, giving informed consent.  The site of surgery properly noted/marked. The patient was taken to Operating Room, identified as Milagro Jonah Botello and the procedure verified as a repeat  Delivery. A Time Out was held and the above information confirmed.    After induction of anesthesia, the patient was prepped and draped in the usual sterile manner while placed in a dorsal supine position with a left lateral tilt.  A villatoro catheter was also placed per nursing. Preoperative antibiotics were administered and an allis test was performed yielding adequate anesthesia.  A Pfannenstiel incision was made and carried down through the subcutaneous tissue to the fascia. Fascial incision was made and extended transversely. The fascia was grasped with Kocher clamps and  from the underlying rectus tissue superiorly and inferiorly. The peritoneum  was identified, found to be free of adherent bowel and entered sharply. Peritoneal incision was extended longitudinally. The vesico-uterine peritoneum was identified and bladder blade was inserted to keep the bladder out of the operative field. A low transverse uterine incision was made with knife and extended with finger fracture. The amniotic sac was ruptured with hysterotomy and the infant was noted to be in vertex position. The fetal head was brought to the incision and elevated out of the pelvis. The patient delivered a single viable female infant without difficulty. After the umbilical cord was clamped and cut cord blood was obtained for evaluation. The placenta was removed intact and appeared normal and was discarded. The uterus was exteriorized. The uterine outline, tubes and ovaries appeared normal. The uterine incision was closed with running locked sutures of #1 Chromic. Hemostasis was observed. The uterus was returned to the abdominal cavity. Incision was reinspected and good hemostasis was noted. The abdominal cavity was irrigated to remove clots. The fascia was then reapproximated with running sutures of #1 PDS. The subcutaneous fat and skin was reapproximated with 2-0 plain gut and 4-0 Monocryl respectively.    Instrument, sponge, and needle counts were correct prior the abdominal closure and at the conclusion of the case.     Patient tolerated procedure well and was in good condition after the procedure.         Specimens:   Specimen (24h ago, onward)      None            Condition: Good    VTE Risk Mitigation (From admission, onward)           Ordered     IP VTE HIGH RISK PATIENT  Once         04/28/25 0856     Place sequential compression device  Until discontinued         04/28/25 0856                    Disposition: PACU - hemodynamically stable.    Attestation: Good         Delivery Information for Girl Milagro Botello    Birth information:  YOB: 2024   Time of birth: 8:12  AM   Sex: female   Head Delivery Date/Time:     Delivery type:    Gestational Age: 39w4d       Delivery Providers    Delivering clinician: Rambo Andrade MD           Measurements    Weight: 3130 g  Weight (lbs): 6 lb 14.4 oz  Length:          Apgars    Living status: Living  Apgar Component Scores:  1 min.:  5 min.:  10 min.:  15 min.:  20 min.:    Skin color:  1  1       Heart rate:  2  2       Reflex irritability:  2  2       Muscle tone:  2  2       Respiratory effort:  2  2       Total:  9  9       Apgars assigned by: ERINN APPLE         Operative Delivery    Forceps attempted?: No  Vacuum extractor attempted?: No         Shoulder Dystocia    Shoulder dystocia present?: No           Presentation    Presentation: Vertex  Position: Left Occiput Anterior           Interventions/Resuscitation           Cord    Vessels: 3 vessels  Complications: None  Delayed Cord Clamping?: No  Cord Clamped Date/Time: 2025  8:12 AM  Cord Blood Disposition: Sent with Baby  Gases Sent?: No  Stem Cell Collection (by MD): No       Placenta    Placenta delivery date/time: 2025 08:13  Placenta removal: Manual removal  Placenta appearance: Intact  Placenta disposition: Discarded           Labor Events:       labor:       Labor Onset Date/Time:         Dilation Complete Date/Time:         Start Pushing Date/Time:         Start Pushing Date/Time:       Rupture Date/Time:            Rupture type:          Fluid Amount:       Fluid Color:                steroids:       Antibiotics given for GBS:       Induction:       Indications for induction:        Augmentation:       Indications for augmentation:       Labor complications:       Additional complications:          Cervical ripening:                     Delivery:      Episiotomy:       Indication for Episiotomy:       Perineal Lacerations:   Repaired:      Periurethral Laceration:   Repaired:     Labial Laceration:   Repaired:     Sulcus Laceration:   Repaired:      Vaginal Laceration:   Repaired:     Cervical Laceration:   Repaired:     Repair suture:       Repair # of packets:       Last Value - EBL - Nursing (mL):       Sum - EBL - Nursing (mL): 0     Last Value - EBL - Anesthesia (mL):      Calculated QBL (mL): 340     Running total QBL (mL): 531.2     Vaginal Sweep Performed:       Surgicount Correct:       Vaginal Packing:   Quantity:       Other providers:            Details (if applicable):  Trial of Labor      Categorization:      Priority:     Indications for :     Incision Type:       Additional  information:  Forceps:    Vacuum:    Breech:    Observed anomalies    Other (Comments):

## 2025-04-28 NOTE — NURSING
# [id number] Leandro was used to communicate with the patient. Report received from [place] APPLE night shift.  24 year old G 2 P 1 at 39.4 wks here for repeat . History/complications of none. no vaginal bleeding, no leaking of vaginal fluid. Fetus: fetal heart tones 144, positive fetal movements. Contractions irregular. Abdomen soft non-tender. Vital signs WNL. Educated on electronic fetal monitoring and assessments. Questions answered. Will update Dr. Andrade as needed.

## 2025-04-28 NOTE — H&P
"   HISTORY AND PHYSICAL                                                OBSTETRICS          Subjective:       Milagro Botello is a 24 y.o.  female with IUP at 39w4d weeks gestation who presents for a scheduled RLTCS. On admission, She denies any obstetric complaints including contractions, vaginal bleeding or leaking fluids. Fetal movements are active. This IUP is complicated by history of prior .    Review of Systems   Constitutional:  Negative for chills and fever.   Eyes:  Negative for visual disturbance.   Respiratory:  Negative for shortness of breath.    Cardiovascular:  Negative for chest pain.   Gastrointestinal:  Negative for diarrhea, nausea and vomiting.   Genitourinary:  Negative for dysuria, hematuria, vaginal bleeding and vaginal discharge.   Integumentary:  Negative for rash.   Neurological:  Negative for headaches.   Psychiatric/Behavioral: Negative.         PMHx: No past medical history on file.    PSHx:   Past Surgical History:   Procedure Laterality Date    OVARIAN CYST SURGERY Left        All:   Review of patient's allergies indicates:   Allergen Reactions    Cinnamon Rash       Meds: Prescriptions Prior to Admission[1]    SH: Social History[2]    FH:   Family History   Problem Relation Name Age of Onset    Hypertension Maternal Grandmother      Diabetes Maternal Grandmother      Hypertension Maternal Grandfather      Diabetes Maternal Grandfather         OBHx:   OB History    Para Term  AB Living   2 1 1 0 0 1   SAB IAB Ectopic Multiple Live Births   0 0 0 0 1      # Outcome Date GA Lbr Dae/2nd Weight Sex Type Anes PTL Lv   2 Current            1 Term         ADOLPH       Objective:       Temp 98.6 °F (37 °C)   Resp 18   Ht 5' 2" (1.575 m)   Wt 83.5 kg (184 lb)   LMP 2024 (Approximate)   Breastfeeding Yes   BMI 33.65 kg/m²     Vitals:    25 0700   Resp: 18   Temp: 98.6 °F (37 °C)   Weight: 83.5 kg (184 lb)   Height: 5' 2" (1.575 m) "       General:   alert, appears stated age and cooperative, no apparent distress   HENT:  normocephalic, atraumatic   Eyes:  extraocular movements and conjunctivae normal   Neck:  supple, range of motion normal, no thyromegaly   Lungs:   no respiratory distress   Heart:   regular rate   Abdomen:  soft, non-tender, non-distended but gravid, no rebound or guarding    Extremities negative edema, negative erythema   FHT: 140, moderate BTBV, +accels, -decels;  Cat 1 (reassuring)                 TOCO: Irregular   Presentations: cephalic by ultrasound   Cervix:     Dilation: Deferred       Recent Growth Scan: EFW 43% (36 wks)    Lab Review  Blood Type A POS  GBBS: negative     Assessment:       39w4d weeks gestation - RLTCS     Plan:     RLTCS   Risks, benefits, alternatives and possible complications have been discussed in detail with the patient.   - Consents signed and to chart  - Admit to Labor and Delivery unit   - Spinal/Epidural per Anesthesia  - Draw CBC, T&S  - Notify Staff  - EFW - As above  - Post-Partum Hemorrhage risk - low    Rambo Andrade M.D.  OB/GYN  [facility]           [1]   Medications Prior to Admission   Medication Sig Dispense Refill Last Dose/Taking    PNV,calcium 72-iron-folic acid (PRENATAL VITAMIN PLUS LOW IRON) 27 mg iron- 1 mg Tab Take 1 tablet by mouth once daily.       PNV,calcium 72-iron-folic acid (PRENATAL VITAMIN PLUS LOW IRON) 27 mg iron- 1 mg Tab Take 1 tablet (1 each total) by mouth once daily. 90 tablet 3     prenatal vit-iron fum-folic ac 27 mg iron- 0.8 mg Tab Take 1 tablet by mouth once daily. 30 tablet 0     triamcinolone acetonide 0.1% (KENALOG) 0.1 % ointment Apply topically 2 (two) times daily. 80 g 0    [2]   Social History  Socioeconomic History    Marital status: Single   Tobacco Use    Smoking status: Never     Passive exposure: Never    Smokeless tobacco: Never   Substance and Sexual Activity    Alcohol use: Not Currently    Drug use: Never    Sexual activity: Yes      Partners: Male

## 2025-04-29 LAB
ABSOLUTE EOSINOPHIL (OHS): 0.06 K/UL
ABSOLUTE MONOCYTE (OHS): 0.83 K/UL (ref 0.3–1)
ABSOLUTE NEUTROPHIL COUNT (OHS): 10.33 K/UL (ref 1.8–7.7)
BASOPHILS # BLD AUTO: 0.02 K/UL
BASOPHILS NFR BLD AUTO: 0.1 %
ERYTHROCYTE [DISTWIDTH] IN BLOOD BY AUTOMATED COUNT: 14.7 % (ref 11.5–14.5)
HCT VFR BLD AUTO: 30.7 % (ref 37–48.5)
HGB BLD-MCNC: 10.2 GM/DL (ref 12–16)
IMM GRANULOCYTES # BLD AUTO: 0.06 K/UL (ref 0–0.04)
IMM GRANULOCYTES NFR BLD AUTO: 0.4 % (ref 0–0.5)
LYMPHOCYTES # BLD AUTO: 2.3 K/UL (ref 1–4.8)
MCH RBC QN AUTO: 28.7 PG (ref 27–31)
MCHC RBC AUTO-ENTMCNC: 33.2 G/DL (ref 32–36)
MCV RBC AUTO: 87 FL (ref 82–98)
NUCLEATED RBC (/100WBC) (OHS): 0 /100 WBC
PLATELET # BLD AUTO: 194 K/UL (ref 150–450)
PMV BLD AUTO: 10.8 FL (ref 9.2–12.9)
RBC # BLD AUTO: 3.55 M/UL (ref 4–5.4)
RELATIVE EOSINOPHIL (OHS): 0.4 %
RELATIVE LYMPHOCYTE (OHS): 16.9 % (ref 18–48)
RELATIVE MONOCYTE (OHS): 6.1 % (ref 4–15)
RELATIVE NEUTROPHIL (OHS): 76.1 % (ref 38–73)
WBC # BLD AUTO: 13.6 K/UL (ref 3.9–12.7)

## 2025-04-29 PROCEDURE — 11000001 HC ACUTE MED/SURG PRIVATE ROOM

## 2025-04-29 PROCEDURE — 63600175 PHARM REV CODE 636 W HCPCS: Mod: JZ,TB | Performed by: STUDENT IN AN ORGANIZED HEALTH CARE EDUCATION/TRAINING PROGRAM

## 2025-04-29 PROCEDURE — 25000003 PHARM REV CODE 250: Performed by: STUDENT IN AN ORGANIZED HEALTH CARE EDUCATION/TRAINING PROGRAM

## 2025-04-29 PROCEDURE — 99232 SBSQ HOSP IP/OBS MODERATE 35: CPT | Mod: ,,, | Performed by: STUDENT IN AN ORGANIZED HEALTH CARE EDUCATION/TRAINING PROGRAM

## 2025-04-29 PROCEDURE — 36415 COLL VENOUS BLD VENIPUNCTURE: CPT | Performed by: STUDENT IN AN ORGANIZED HEALTH CARE EDUCATION/TRAINING PROGRAM

## 2025-04-29 PROCEDURE — 85025 COMPLETE CBC W/AUTO DIFF WBC: CPT | Performed by: STUDENT IN AN ORGANIZED HEALTH CARE EDUCATION/TRAINING PROGRAM

## 2025-04-29 RX ADMIN — KETOROLAC TROMETHAMINE 30 MG: 30 INJECTION, SOLUTION INTRAMUSCULAR; INTRAVENOUS at 12:04

## 2025-04-29 RX ADMIN — DOCUSATE SODIUM 200 MG: 100 CAPSULE, LIQUID FILLED ORAL at 09:04

## 2025-04-29 RX ADMIN — DOCUSATE SODIUM 200 MG: 100 CAPSULE, LIQUID FILLED ORAL at 08:04

## 2025-04-29 RX ADMIN — OXYCODONE AND ACETAMINOPHEN 1 TABLET: 10; 325 TABLET ORAL at 04:04

## 2025-04-29 RX ADMIN — PRENATAL VIT W/ FE FUMARATE-FA TAB 27-0.8 MG 1 TABLET: 27-0.8 TAB at 09:04

## 2025-04-29 RX ADMIN — MUPIROCIN: 20 OINTMENT TOPICAL at 09:04

## 2025-04-29 RX ADMIN — MUPIROCIN: 20 OINTMENT TOPICAL at 08:04

## 2025-04-29 RX ADMIN — IBUPROFEN 600 MG: 600 TABLET, FILM COATED ORAL at 11:04

## 2025-04-29 RX ADMIN — OXYCODONE AND ACETAMINOPHEN 1 TABLET: 10; 325 TABLET ORAL at 10:04

## 2025-04-29 RX ADMIN — IBUPROFEN 600 MG: 600 TABLET, FILM COATED ORAL at 04:04

## 2025-04-29 NOTE — ANESTHESIA POSTPROCEDURE EVALUATION
Anesthesia Post Evaluation    Patient: Milagro Botello    Procedure(s) Performed: Procedure(s) (LRB):   SECTION (N/A)    Final Anesthesia Type: spinal      Patient location during evaluation: labor & delivery  Patient participation: Yes- Able to Participate  Level of consciousness: awake  Post-procedure vital signs: reviewed and stable  Pain management: adequate  Airway patency: patent    PONV status at discharge: No PONV  Anesthetic complications: no      Cardiovascular status: blood pressure returned to baseline  Respiratory status: unassisted  Hydration status: euvolemic  Follow-up not needed.              Vitals Value Taken Time   /79 25 11:45   Temp 36.8 °C (98.3 °F) 25 11:45   Pulse 83 25 11:45   Resp 18 25 11:45   SpO2 97 % 25 11:45         Event Time   Out of Recovery 11:11:00         Pain/Gordon Score: Pain Rating Prior to Med Admin: 0 (2025  6:02 AM)

## 2025-04-29 NOTE — PROGRESS NOTES
POSTPARTUM PROGRESS NOTE     Milagro Botello is a 24 y.o. female POD #1 status post Repeat  section at 39w4d in a pregnancy complicated by prior  delivery. Patient is doing well this morning. She denies nausea, vomiting, fever or chills.  Patient reports moderate abdominal pain that is adequately relieved by provided pain medications. Lochia is mild to moderate  and decreasing. Patient is voiding without difficulty and ambulating with no difficulty. She has passed flatus, and has not had BM. Patient does plan to breast feed.    Objective:       Temp:  [97.6 °F (36.4 °C)-98.8 °F (37.1 °C)] 98.2 °F (36.8 °C)  Pulse:  [74-93] 85  Resp:  [16-18] 18  SpO2:  [86 %-98 %] 96 %  BP: ()/(53-83) 108/71    General:   alert, appears stated age, and cooperative   Lungs:   Non-labored breathing   Heart:   regular rate and rhythm   Abdomen:  soft, non-tender; bowel sounds normal; no masses,  no organomegaly   Uterus:  firm located at the umblicus.    Incision: Bandage in place, clean, dry and intact   Extremities: no pedal edema noted     Lab Review  No results found for this or any previous visit (from the past 4 hours).    I/O    Intake/Output Summary (Last 24 hours) at 2025 0711  Last data filed at 2025 2315  Gross per 24 hour   Intake 600 ml   Output 2081.2 ml   Net -1481.2 ml        Assessment:     Problem List[1]     Plan:   1. Postpartum care:  - Patient doing well. Continue routine management and advances.  - Continue PO pain meds. Pain well controlled.  - Heme: H/H - 10.5/31.6 . AM CBC Pending  - Encourage ambulation  - Contraception - Continue to discuss  - Lactation consultant following    Dispo: As patient meets milestones, will plan to discharge POD 2-4.    Rambo Andrade         [1]   Patient Active Problem List  Diagnosis    Encounter for supervision of normal pregnancy in third trimester    History of  section    Repeat low transverse  delivery

## 2025-04-29 NOTE — PLAN OF CARE
Note copied from Infant's chart (MRN: [id number])     SOCIAL WORK DISCHARGE PLANNING ASSESSMENT     SW completed discharge planning assessment with pt's parents with assistance from  Cyndie [id number]. Pt's parents were easily engaged and education on the role of  was provided. Pt's parents reported all necessities for patient were obtained, including a car seat. Pt's parents reported they have good support from family and friends. Pt's father will provide transportation home following discharge. No needs for community resources were reported. Pt's parents were encouraged to call with any questions or concerns. Pt's parents verbalized understanding.      Legal Name: Danilo Mckenzie            :  2024  Address: [place], [place]  Parent's Phone Numbers: pt's mother Milagro Botello [phone] and pt's father Jhonatan Marie [phone]     Pediatrician:  Dr. Roni Cheek        Birth Hospital:[facility]           KINGS: 25     Birth Weight:   3.13 kg (6 lb 14.4 oz)              Birth Length: 47cm                  Gestational Age: 39w4d           Apgars    Living status: Living  Apgar Component Scores:  1 min.:  5 min.:  10 min.:  15 min.:  20 min.:    Skin color:  1  1          Heart rate:  2  2          Reflex irritability:  2  2          Muscle tone:  2  2          Respiratory effort:  2  2          Total:  9  9          Apgars assigned by: ERINN APPLE           25 1156   OB Discharge Planning Assessment   Assessment Type Discharge Planning Assessment   Source of Information family; utilized  (pt's parents with  Cyndie [id number])   Verified Demographic and Insurance Information Yes   Insurance Medicaid   Medicaid [employer] Medicaid Insurance Primary   Spiritual Affiliation Religious    Contact Status none needed   Father's Involvement Fully Involved   Is Father signing the birth certificate Yes    Father's Address [place]   Family Involvement Moderate   Primary Contact Name and Number pt's mother Milagro Botello [phone] and pt's father Jhonatan Marie [phone]   Received Prenatal Care Yes   Transportation Anticipated family or friend will provide   Receive [facility] Benefits Already certified, will apply for new born    Arrangements Self;Family;Friends   Infant Feeding Plan formula feeding   Does baby have crib or safe sleep space? Yes   Do you have a car seat? Yes   Has other essential care items? Clothing;Bottles;Diapers   Pediatrician Dr. Roni Cheek   Resources/Education Provided Preparing for Your Baby's Discharge Home   DCFS No indications (Indicators for Report)   Discharge Plan A Home with family

## 2025-04-30 VITALS
BODY MASS INDEX: 39.7 KG/M2 | SYSTOLIC BLOOD PRESSURE: 112 MMHG | OXYGEN SATURATION: 97 % | HEIGHT: 57 IN | TEMPERATURE: 98 F | RESPIRATION RATE: 18 BRPM | WEIGHT: 184 LBS | DIASTOLIC BLOOD PRESSURE: 65 MMHG | HEART RATE: 88 BPM

## 2025-04-30 PROCEDURE — 25000003 PHARM REV CODE 250: Performed by: STUDENT IN AN ORGANIZED HEALTH CARE EDUCATION/TRAINING PROGRAM

## 2025-04-30 PROCEDURE — 99238 HOSP IP/OBS DSCHRG MGMT 30/<: CPT | Mod: ,,, | Performed by: STUDENT IN AN ORGANIZED HEALTH CARE EDUCATION/TRAINING PROGRAM

## 2025-04-30 RX ORDER — IBUPROFEN 800 MG/1
800 TABLET ORAL 3 TIMES DAILY
Qty: 60 TABLET | Refills: 1 | Status: SHIPPED | OUTPATIENT
Start: 2025-04-30

## 2025-04-30 RX ORDER — OXYCODONE AND ACETAMINOPHEN 5; 325 MG/1; MG/1
1 TABLET ORAL EVERY 4 HOURS PRN
Qty: 20 TABLET | Refills: 0 | Status: SHIPPED | OUTPATIENT
Start: 2025-04-30

## 2025-04-30 RX ADMIN — SIMETHICONE 80 MG: 80 TABLET, CHEWABLE ORAL at 12:04

## 2025-04-30 RX ADMIN — MAGNESIUM HYDROXIDE 2400 MG: 400 SUSPENSION ORAL at 12:04

## 2025-04-30 RX ADMIN — IBUPROFEN 600 MG: 600 TABLET, FILM COATED ORAL at 06:04

## 2025-04-30 NOTE — DISCHARGE INSTRUCTIONS
"Patient Discharge Instructions for Postpartum Women    Resume Regular Diet  Increase activity gradually, no heavy lifting  Shower  No tampons, douching or sexual intercourse.  Discuss birth control options with your physician.  Wear a support bra  Return to work/school when you've been cleared by a physician    Call your physician if     *Fever of 100.4 or higher  *Persistent nausea/ vomiting  *Incisional drainage  *Heavy vaginal bleeding or large clots (Heavy bleeding is soaking 1 pad in an hour)  *Swelling and pain in arms or legs  *Severe headaches, blurred vision or fainting  *Shortness of breath  *Frequency and burning with urination  *Signs of postpartum depression, discuss these signs with your physician    Call lactation services for questions regarding feeding, nipple and breast care, and general questions about lactation.  They can be reached at [phone]         Understanding Postpartum Depression    You've just had a baby.  You know you should be excited and happy.  But instead you find yourself crying for no reason.  You may have trouble coping with your daily tasks.  You feel sad, tired, and hopeless most of the time.  You may even feel ashamed or guilty.  But what you're going through is not your fault and you can feel better.  Talk to your doctor.  He or she can help.    Depression After Childbirth    You may be weepy and tired right after giving birth.  These feelings are normal.  They're sometimes called the "baby blues."  These blues go away 2-3 weeks.  However, postpartum (meaning "after birth") depression lasts much longer and is more sever than the "baby blues."  It can make you feel sad and hopeless.  You may also fear that your baby will be harmed and worry about being a bad mother.      What is Depression?    Depression is a mood disorder that affects the way you think and feel.  The most common symptom is a feeling of deep sadness.  You may also feel as if you just can't cope with life.  "   Other symptoms include:      * Gaining or loosing weight  * Sleeping too much or too little  * Feeling tired all the time  * Feeling restless  * Fears of harming your baby   * Lack of interest in your baby  * Feeling worthless or guilty  * No longer finding pleasure in things you used to  * Having trouble thinking clearly or making decisions  * Thoughts of hurting yourself or your baby    What Causes Postpartum Depression    The exact causes of postpartum depression isn't known.  It may be due to changes in your hormones during and after childbirth.  You may also be tired from caring for your baby and adjusting to being a mother.  All these factors may make you feel depressed.  In some cases, your genes may also play a role.    Depression Can Be Treated    The good news is that there are many ways to treat postpartum depression.  Talking to your doctor is the first step toward feeling better.    Resources:    * National [place] of Mental Health  -- 818-686-6494    www.nimh.nih.gov    * National [place] on Mental Illness --137.374.5235    Www.kaity.org    * Mental Health [place] -- [phone]     Www.[facility].org    * National Suicide Hotline --485.982.6418 (800-SUICIDE)    1401-5877 [employer]  All rights reserved.  This information is not intended as a substitute for professional medical care.  Always follow up with your healthcare professional's instructions.   Instrucciones Para Luis de Lary    Instrucciones a Seguir    Dieta regular  Actividad: Aumentarntar gradualmente  Moy: Regadera o Do  Restricciones: No levantar nada pesado  Cuidado Personal: No tampones o duchas vaginales  Actividad Sexual: Roula relaciones sexuales  Planificacion Familiar: Consulta con santamaria medico  Cuidado de los Senos: Use un sosten de soporte  Regresar al trabajo/escuela: Cuando santamaria medico le indique    Cuando debe llamar al Doctor    *Fiebre de 100.4 o mas alto  *Nausea/Vomito persistente  *Secrecion de la  "incision  *Henrik sangrado vaginal o coagulos  *Inflamacion/dolor en los brazos o las piernas  *Severo dolor de luigi, vision borrosa or desmayos  *Frequencia/ardor urinario  *Senales de depresion post-parto        La Depresion Post-Parto    Usted acaba de tener un jeffrey'.  A pesar de que sabe que deberia sentirse emocionada y taveras, lo que seinte son ganas de llorar sin motivo y tiene dificultades para realizar amalia tareas diarias.  Usted pasa la mayor parte del tiempo edgardo, cansada y desesperanzada, y hasta podria sentirse avergonzada o culpable.  Mirtha lo que le esta sucediendo no es culpa suya, y puede sentirse mejor.  Hable con santamaria medico para que la ayude.     La Depresion Despues del Parto    Marco Antonio vez sienta cansancio y ganas de llorar angelina despues de oziel a rhoda.  Esta etapa melancolica, denominada "baby blues", puede hacerla sentir edgardo y desesperanzada, o temerosa de que algo quiana le vaya a pasar al jeffrey.  Algunas mujeres hasta llegan a poner en tariq el que puedan ser buenas madres.    Que' es la Depresion?    La depresion es un trastorno del animo que afecta santamaria manera de pernsar y de sentir.  El sintoma mas frecuente es un sentimiento de honda tristeza: tambien podria causane la sensacion de que ya no puede sobrellevar la marian.    Otros sintomas incluyen:      * Ganar o perder mucho peso  * Dormir en exceso o demasiado poco  * Estar cansada todo el tiempo  * Sentirse inquieta  * Tener miedo de querer herir al jeffrey'  * No tener interes por el jeffrey'  * Sentirse inutil o culpable   * No encontrar placer en las cosas que solia gustarle hacer  * Dificultades para pensar con claridad o vickie decisiones  * Pensar sobre la muerte o el suicidio     Que' Causa la Depresion Postparto?    La causea exacta de la depresion postarto se desconce, aunque posiblemente es el resultado de los cambios hormonales que suceden brennan y despues del parto.  Tambien puede deberse al cansancio que le causan las exigencias del jeffrey' " y el proceso de adaptacion a santamaria maternidad.  Todos estos factores podrian deprimirla.  En algunos casos, existe elvia predisposicion genetica a jean-pierre tipo de depresion.    La depresion puede tratarse    Lo liao es que hay muchas maneras de tratar la depresion postparo.  Emprenda el primer paso para sentirse mejor hablando con santamaria medico.     Recursos    * National Institutes of Mental Health-- 166-862-6482     www.[facility].nih.gov    * National [place] on Mental Illness -- 952-979-2238     Www.kaity.org    * Mental Health [place] --  [phone]     Www.[facility].org    * [facility] [facility] -- [phone]    [phone] [employer].  Todos los derechos reservados.  Esta informacion no pretende sustituir las atencion medica profesional.  Solo santamaria medico puede diagnosticar y tratar un problema de reema.          Instrucciones de la lactancia maternal para los bebes recién nacidos:   La Academia de Pediatra Americana recomienda la leche maternal brent la unica Griffin de nutrición para santamaria bebé brennan los primeros 6 meses de la marian, y puede continuar, con la introducción de comida, hasta y despues de 1 ano de edad. Informado sobre willis consejos: Hay muchos beneficios de amamantar para el reema de la madre y del bebé. Roula los chupones, teteras, o botellas por lo menos por las primeras 4 semanas. Usar los chupones, teteras, o botellas pueden interumpir el proceso de amamantar.   Alimenta en cuanto hay muestras de hambre:  Alton en la boca, hacienda movimientos de succionar.  Ruiditos suavecitos o estirarse  Llorar es un signo de hambre tardío: no esperes a que el bebé llore.  Es de esperarse que haya 8-12 alimentaciones por 24 horas, aunque estas alimentaciones no sigan un horario definido.  Alterna el seno con el que empiezas, o empieza con el seno que se siente más lleno.  Cambia de lado cuando el bebé empiece a tragar más despacio o se desconecte del seno.  Esta beryl si el bebé no come del alejandro seno en  cada alimentación.  Si el bebé esta muy dormido o somnoliente: el contacto de piel a piel puede animarlo a empezar a comer:  Quítale la camiseta y acuéstalo sobre tu pecho desnudo  Duerme cerca de tu bebé, aun en la casa. Aprende a oziel pecho acostada.  En la posición correcta los dos estarán cómodos:  barbilla con seno, pecho con pecho  Labio del bebé abierto hacia afuera para tragar: el labio del bebé debe estar volteado hacia afuera  Boca abierta beryl travis: la boca del bebé cubre la mayoría de la areola (el área oscura del seno)--no nada más el pezón  Fíjate en los signos de que hay transferencia de leche:  Puedes oír al bebé tragar.  No se oyen ruidos más o menos ariel brent clic.  El bebé no demuestra que tiene más hambre después de la alimentación.  El cuerpo del bebé y amalia kike están relajados por un tiempo corto.  Lo que entra, tiene que salir. Está pendiente de:  Al cuarto día, por lo menos 3 cacas al día.  La adrienne cambia de rasheed a amberly o café y luego a amarillo más líquido cuando baja tu leche.  Después del cuarto día, por lo menos 6 pa?ales mojados/pesados al día.  La orina debe ser de color amarillo tai cuando baja tu leche.  Debes vickie agua cuando tienes sed y comer alimentos sanos cuando tiene hambre. Milagro siesta para descansar lo suficiente.   No tomes medicamentos o alcohol sino con el consejo de santamaria doctor. Puedes llamar al [facility] (Infant Risk Center): ([phone]), Lunes a Viernes, 8am-5pm, para obtener información sobre los medicamentos y la leche maternal.  La demi de seguimiento con la pediatra debe ser 2 días después de salir del hospital. El bebé deberia clare ganado el peso de nacimiento cuando tiene 10-14 días de marian.    Recursos comunitarios:    [facility] directa de lactancia materna: [phone]    Centros hospitalarios de lactancia materna/Consultores de lactancia:  [facility] [phone]  [facility]  [phone]  Consultas ambulatorias sobre lactancia de [facility] [phone]  [facility] [phone]    [facility]-Control de Envenenamientos [phone] [facility].org  Asesoramiento médico gratuito las 24 horas del día, los 7 días de la semana a través de la [place] de ayuda contra intoxicaciones y la herramienta en línea    WIC (mujeres, bebés, niños): [phone] ldh.la.gov/WIC  Un programa de nutrición a nivel estatal para mujeres embarazadas, lactantes y posparto, bebés y niños menores de 5 años. Proporciona información sobre alimentos y nutrición. También proporciona apoyo a la lactancia materna por parte de madres consejeras.    Sitio web del Dr. King Rodgers para vídeos sobre el cierre e información general:        www.breastfeedinginc.ca  Infant Risk Center es un centro de llamadas que alicia información sobre la seguridad de vickie medicamentos brennan la lactancia.  Llame al [phone], de lunes a viernes, de 8 a.m. a 5 p.m., CT.  La Asociación Internacional de Consultores en Lactancia proporciona recursos de asistencia:        www.ilca.org  La Coalición de Lactancia Materna de [place] proporciona información y recursos para padres  y la comunidad [email or web address]  Búsqueda por código postal de recursos sobre lactancia materna y más:                                                                              www.LaBreastfeedingSupport.org       Socios para bebés sanos: 1-800-251-BABY(2229)    Recursos del área de [facility]:   Servicios de [facility]: desno.org  Los centros de reema comunitarios están ubicados en [place]ra Saleh [place], [place], [place], [facility] y [place]. Ofrece servicios pediátricos, servicios de reema para mujeres, servicios WIC y más.   Bebé Café babycafeusa.org  Grupos informales, gratuitos y sin demi previa de apoyo a la lactancia materna que ofrecen atención e intervención profesional en  lactancia.  Semaj VAUGHN Baby Café se reúne los jueves en [facility] (ubicado en el salón Creole en el primer piso) de 1:00 p. m. a 3:00 p. m. Un traductor de español está disponible en esta reunión.   Birthmark Doulas/Centro de lactancia materna de [facility]: [email or web address]  Entrega de alimentación infantil en clínicas, servicios de lactancia, grupos de apoyo, programas educativos.   Café con leche: [phone] Moviecom.tv.Kappa Prime/groups/cafeaulaitlouisiana  Rashid de apoyo gratuito a la lactancia materna para familias de color   Anidación de JOHANA: [phone] [email or web address]  Apoyo presencial y virtual para familias brennan el embarazo, el parto y la paternidad temprana.   [facility] Lactation Care, [place] [facility] (Nahomy Tariq, RN, IBCLC)  [phone]  [email or web address]  [email or web address]   Medicina Avanzada de Lactancia Materna de [facility]: Dra. Francisca Hernández.  [facility] [place] [address]  www.[email or web address] Analysis Systems.Kappa Prime  [phone]  [email or web address]   Comienzo Saludable [facility].  [phone] ([facility]) [phone] ([place])  johana.gov/health-department/healthy-start  Atiende a mujeres en edad fértil y aborda problemas de mujeres embarazadas y amalia hijos desde el nacimiento hasta los dos años.   Liga [place] - [place] de Michael Hopper.Kappa Prime/Moviecom.[email or web address].[employer] Prime/terence  Grupos de apoyo madre a madre presenciales y virtuales con educación, información, apoyo y estímulo a las mujeres que yoanna amamantar.

## 2025-04-30 NOTE — PROGRESS NOTES
POSTPARTUM PROGRESS NOTE     Milagro Botello is a 24 y.o. female POD #2 status post Repeat  section at 39w4d in a pregnancy complicated by prior  delivery. Patient is doing well this morning. She denies nausea, vomiting, fever or chills.  Patient reports moderate abdominal pain that is adequately relieved by provided pain medications. Lochia is mild to moderate  and decreasing. Patient is voiding without difficulty and ambulating with no difficulty. She has passed flatus, and has not had BM. Patient does plan to breast feed.    Objective:       Temp:  [97.5 °F (36.4 °C)-98.6 °F (37 °C)] 97.5 °F (36.4 °C)  Pulse:  [80-94] 88  Resp:  [17-19] 18  SpO2:  [96 %-97 %] 97 %  BP: ()/(56-77) 112/65    General:   alert, appears stated age, and cooperative   Lungs:   Non-labored breathing   Heart:   regular rate and rhythm   Abdomen:  soft, non-tender; bowel sounds normal; no masses,  no organomegaly   Uterus:  firm located at the umblicus.    Incision: Bandage in place, clean, dry and intact   Extremities: no pedal edema noted     Lab Review  No results found for this or any previous visit (from the past 4 hours).    I/O  No intake or output data in the 24 hours ending 25 1334       Assessment:     Problem List[1]     Plan:   1. Postpartum care:  - Patient doing well. Continue routine management and advances.  - Continue PO pain meds. Pain well controlled.  - Heme: H/H - 10.5/31.6 . AM CBC Pending  - Encourage ambulation  - Contraception - Continue to discuss  - Lactation consultant following    Dispo: As patient meets milestones, will plan to discharge today.    Rambo Andrade         [1]   Patient Active Problem List  Diagnosis    Encounter for supervision of normal pregnancy in third trimester    History of  section    Repeat low transverse  delivery

## 2025-04-30 NOTE — LACTATION NOTE
Received notification that mom was in need of breastfeeding assistance.  Offered assistance with breastfeeding and mom accepted. Mom currently sitting in rocking chair with baby skin to skin.Asked mom if she knew how to hand express and she stated yes. Large drops of colostrum observed to right nipple. Assisted mom with providing pillow support and placed baby in football position. Instructed mom to apply nipple to baby's upper lip/nose and wait for baby to open mouth wide for deep latch. Baby was hesitant to open mouth widely initially. However, baby eventually opened mouth and was latched onto breast. Baby began sucking and this RN assisted mom by providing breast compressions. Baby made clicking sound for the entire duration of feeding as she sucked. Mom also complained of pain as baby sucked.This RN used finger to corner of baby's mouth to break seal and relatch baby again, but clicking sound continued. Baby was again unlatched safely with finger and this RN, with permission, placed finger into corner of baby's mouth to assess suck. No tight lingual or labial frenulum observed. However, as baby sucked, tight smashing motion observed continually.  Baby's mouth observed to be small, with recessed chin also observed.  Asked mom and dad about how baby feeds with bottle: they reported that it takes baby about an hour to feed her from bottle and that she also makes clicking sound with bottle while she sucks.  Encouraged mom to speak with baby's pediatrician regarding baby's suck and difficulty sucking at breast and with bottle.  Mom verbalized understanding.  Encouraged mom to continue to try to breastfeed baby but that if it becomes too painful/baby won't latch, she should be pumping every 3 hours to maintain supply.   Discussed how mom must phone [facility] directly to obtain pump for home. Encouraged mom to use manual pump piece in pump kit until her electric pump arrives. Encouraged mom to take all pump parts  home with her.  Mom verbalized understanding.    [facility]  Lactation Note - Mom    SUMMARY     Maternal Assessment    Breast Shape: Bilateral:, round  Breast Density: Bilateral:, filling  Areola: Bilateral:, elastic  Nipples: Bilateral:, everted  Nipple Width:  (large in diameter)  Left Nipple Symptoms: tender  Right Nipple Symptoms: tender      LATCH Score         Breasts WDL    Breast WDL: WDL except, nipple symptoms  Left Nipple Symptoms: tender  Right Nipple Symptoms: tender    Maternal Infant Feeding    Maternal Preparation: breast care, hand hygiene  Maternal Emotional State: assist needed  Infant Positioning: clutch/football  Signs of Milk Transfer: audible swallow, infant jaw motion present, suck/swallow ratio  Pain with Feeding: yes  Pain Location: nipples, bilateral  Pain Description: sharp, soreness  Comfort Measures Before/During Feeding: latch adjusted, infant position adjusted, maternal position adjusted, suction broken using finger  Milk Ejection Reflex: absent  Comfort Measures Following Feeding: air-drying encouraged, expressed milk applied  Nipple Shape After Feeding, Left: round  Nipple Shape After Feeding, Right: round  Latch Assistance: yes    Lactation Referrals    Community Referrals: home health care  Home Health Care Lactation Follow-up Date/Time: given paper order  Outpatient Lactation Program Lactation Follow-up Date/Time: call Lact. Ctr. prn  Pediatric Care Provider Lactation Follow-up Date/Time: f/u w/pediatrician in 1-2 days for weight check  Support Group Lactation Follow-up Date/Time: [facility] h/o given    Lactation Interventions    Breast Care: Breastfeeding: breast milk to nipples, open to air, warm shower encouraged  Breastfeeding Assistance: assisted with positioning, electric breast pump used, feeding cue recognition promoted, feeding on demand promoted, feeding session observed, hand expression verified, infant latch-on verified, infant  stimulated to wakeful state, infant suck/swallow verified, support offered  Breast Care: Breastfeeding: breast milk to nipples, open to air, warm shower encouraged  Breastfeeding Assistance: assisted with positioning, electric breast pump used, feeding cue recognition promoted, feeding on demand promoted, feeding session observed, hand expression verified, infant latch-on verified, infant stimulated to wakeful state, infant suck/swallow verified, support offered  Breastfeeding Support: diary/feeding log utilized, encouragement provided, infant-mother separation minimized, lactation counseling provided       Breastfeeding Session    Breast Pumping Interventions: frequent pumping encouraged, post-feed pumping encouraged  Infant Positioning: clutch/football  Signs of Milk Transfer: audible swallow, infant jaw motion present, suck/swallow ratio    Maternal Information

## 2025-04-30 NOTE — LACTATION NOTE
0050 - assisted pt with attempting to latch infant; however, infant was unable to latch.  Infant would open her mouth; however, would not be able to attain a deep latch.  Smacking was audible.  Infant's nurse @ bedside assisting w/latching but was unable to attain a deep latch.  Infant appeared labored/tachypneic.  Infant's nurse took infant to the nursery for observation.      0130 - set pt up on a breast pump d/t ineffective latch.  Using  Luis [facility] #[id number].  Reviewed frequency of pumping.  Instructed pt to continue putting infant to breast Q3 and if infant doesn't latch effectively, to pump and feed whatever she pumps to infant or have it available for next feed. Told parents to feed EBM first then supplement w/formula.  Instructed pt attempt to BF first for stimulation then pump.  Reviewed how to assemble, disassemble pump parts, frequency of cleaning and sterilizing pump parts.  Used the 24 mm horns.  Pt denied pain; however, breast tissue noted to be rubbing.  Brought pt the 27 mm and instructed her to use them with the next pump session.  Pt was able to get 7 mls of EBM and set it aside for the next feed.  Informed pt that EBM is good at room temp for 4 hrs.      Questions encouraged and answered.  Pt verbalized understanding.  Instructed pt to call for questions or pump assistance.  Pt verbalized understanding.

## 2025-04-30 NOTE — PLAN OF CARE
Problem: Adult Inpatient Plan of Care  Goal: Plan of Care Review  Outcome: Progressing  Flowsheets (Taken 4/30/2025 8216)  Plan of Care Reviewed With:   patient   spouse

## 2025-04-30 NOTE — LACTATION NOTE
Rounded on couplet.  Infant due to eat.  Had father unswaddle and undress infant.  Placed infant in football position on the L side.  Infant requiring stimulation.  Infant would start rooting.  Offered assistance and pt accepted.  Explained and demonstrated how to hand express and pt was able to get small drops of colostrum bilaterally.  Demonstrated how to hold infant and breast, instructed pt to start from nipple then move down to mouth.  Instructed mother to advance infant to the breast as soon as infant opens her mouth wide.  Pt's nipple on the larger side vs infant's mouth.  Attempted to latch infant on the L side for approx. 10 mins.  Infant would open and appear to latch, pt wincing in pain.  Pt c/o 7/10 pain; however, upon checking latch, infant would be at the tip of the nipple or off w/audible smacking from the initial latch to when she slides off.  Switched to the R side and was still the same.  Infant was unable to attain a latch.  After approx. 30 mins, pt instructed infant's father to feed formula, which she had already received w/prior feeds.      Information provided on benefits of exclusive breastfeeding, supply and demand, adequacy of colostrum, feeding frequency and normal  feeding patterns. Informed about risks of formula feeding, nipple confusion, and decreased milk supply. After education, mother still chooses to supplement w/formula.  Father started feeding infant but infant was not receptive.  Offered to assist and father accepted.  Placed infant in her crib and burped her.  Attempted to formula feed infant; however, infant was slow to feed, requiring chin support and stimulation to keep feeding.  After approx. 10-15 mins, infant drank 15 mls.      Reinforced importance to feeding infant 8x or more in 24 hrs and instructed them to call infant's nurse or lactation nurse for feeding assistance.     Questions encouraged and answered.  Pt and significant other verbalized  understanding.    Used  B[facility]ict #[id number]    [facility]  Lactation Note - Mom    SUMMARY     Maternal Assessment    Breast Shape: Bilateral:, wide  Breast Density: Bilateral:, soft  Areola: Bilateral:, elastic  Nipples: Bilateral:, everted  Left Nipple Symptoms: tender  Right Nipple Symptoms: tender      LATCH Score         Breasts WDL    Breast WDL: WDL except, nipple symptoms  Left Nipple Symptoms: tender  Right Nipple Symptoms: tender    Maternal Infant Feeding    Maternal Preparation: breast care, hand hygiene  Maternal Emotional State: assist needed, relaxed  Infant Positioning: clutch/football  Signs of Milk Transfer: other (see comments) (unable to attain a latch)  Pain with Feeding: yes  Pain Location: nipples, bilateral  Pain Description: soreness  Comfort Measures Before/During Feeding: latch adjusted, infant position adjusted, suction broken using finger, maternal position adjusted  Comfort Measures Following Feeding: air-drying encouraged  Latch Assistance: yes    Lactation Referrals    Community Referrals: outpatient lactation program, pediatric care provider, support group  Outpatient Lactation Program Lactation Follow-up Date/Time: call Lact. Ctr. prn  Pediatric Care Provider Lactation Follow-up Date/Time: f/u w/pediatrician in 1-2 days for weight check  Support Group Lactation Follow-up Date/Time: [facility] h/o given    Lactation Interventions    Breast Care: Breastfeeding: milk massaged towards nipple, open to air  Breastfeeding Assistance: assisted with positioning, feeding cue recognition promoted, feeding on demand promoted, feeding session observed, hand expression verified, infant stimulated to wakeful state, support offered  Breast Care: Breastfeeding: milk massaged towards nipple, open to air  Breastfeeding Assistance: assisted with positioning, feeding cue recognition promoted, feeding on demand promoted, feeding session observed, hand expression  verified, infant stimulated to wakeful state, support offered  Breastfeeding Support: encouragement provided, infant-mother separation minimized, lactation counseling provided, maternal hydration promoted, maternal nutrition promoted, maternal rest encouraged       Breastfeeding Session    Breast Pumping Interventions: early pumping promoted, frequent pumping encouraged  Infant Positioning: clutch/football  Signs of Milk Transfer: other (see comments) (unable to attain a latch)    Maternal Information

## 2025-04-30 NOTE — DISCHARGE SUMMARY
Delivery Discharge Summary  Obstetrics      Primary OB Clinician: Rambo Andrade MD      Admission date: 2025  Discharge date: 2025    Disposition: To home, self care    Discharge Diagnosis List:    Problem List[1]    Procedure: , due to history of prior  delivery    Hospital Course:  Milagro Botello is a 24 y.o. now , POD #2 who was admitted on 2025 at 39w4d for RLTCS. Patient was subsequently admitted to labor and delivery unit with signed consents. She underwent an uncomplicated repeat  delivery. Please see delivery note for further details. Her postpartum course was uncomplicated. On discharge day, patient's pain is controlled with oral pain medications. Pt is tolerating ambulation without SOB or CP, and regular diet without N/V. Reports lochia is mild. Denies any HA, vision changes, F/C, LE swelling. Denies any breast pain/soreness.    Pt in stable condition and ready for discharge. She has been instructed to start and/or continue medications and follow up with her obstetrics provider as listed below.    Pertinent studies:  CBC  Recent Labs   Lab 25  0610 25  0549   WBC 8.59 13.60*   HGB 10.5* 10.2*   HCT 31.6* 30.7*   MCV 87 87    194          Immunization History   Administered Date(s) Administered    Tdap 02/10/2025        Delivery:    Episiotomy:     Lacerations:     Repair suture:     Repair # of packets:     Blood loss (ml):       Birth information:  YOB: 2024   Time of birth: 8:12 AM   Sex: female   Delivery type:    Gestational Age: 39w4d     Measurements    Weight: 3130 g  Weight (lbs): 6 lb 14.4 oz  Length:          Delivery Clinician: Delivery Providers    Delivering clinician: Rambo Andrade MD          Additional  information:  Forceps:    Vacuum:    Breech:    Observed anomalies      Living?:     Apgars    Living status: Living  Apgar Component Scores:  1 min.:  5 min.:  10 min.:  15 min.:  20 min.:     Skin color:  1  1       Heart rate:  2  2       Reflex irritability:  2  2       Muscle tone:  2  2       Respiratory effort:  2  2       Total:  9  9       Apgars assigned by: ERINN APPLE         Placenta: Delivered:       appearance    Patient Instructions:   Current Discharge Medication List        START taking these medications    Details   ibuprofen (ADVIL,MOTRIN) 800 MG tablet Take 1 tablet (800 mg total) by mouth 3 (three) times daily.  Qty: 60 tablet, Refills: 1      oxyCODONE-acetaminophen (PERCOCET) 5-325 mg per tablet Take 1 tablet by mouth every 4 (four) hours as needed for Pain.  Qty: 20 tablet, Refills: 0    Comments: n/a   Associated Diagnoses:  delivery delivered           CONTINUE these medications which have NOT CHANGED    Details   triamcinolone acetonide 0.1% (KENALOG) 0.1 % ointment Apply topically 2 (two) times daily.  Qty: 80 g, Refills: 0           STOP taking these medications       PNV,calcium 72-iron-folic acid (PRENATAL VITAMIN PLUS LOW IRON) 27 mg iron- 1 mg Tab Comments:   Reason for Stopping:         PNV,calcium 72-iron-folic acid (PRENATAL VITAMIN PLUS LOW IRON) 27 mg iron- 1 mg Tab Comments:   Reason for Stopping:         prenatal vit-iron fum-folic ac 27 mg iron- 0.8 mg Tab Comments:   Reason for Stopping:               Discharge Procedure Orders   BREAST PUMP FOR HOME USE     Order Specific Question Answer Comments   Type of pump: Electric    Weight: 83.5 kg (184 lb)    Length of need (1-99 months): 99      Pelvic Rest   Order Comments: Pelvic Rest - Nothing in the Vagina for 6 weeks.     Notify your health care provider if you experience any of the following:   Order Comments: Vaginal Bleeding greater than a pad per hour.     Notify your health care provider if you experience any of the following:  increased confusion or weakness     Notify your health care provider if you experience any of the following:  persistent dizziness, light-headedness, or visual disturbances      Notify your health care provider if you experience any of the following:  worsening rash     Notify your health care provider if you experience any of the following:  severe persistent headache     Notify your health care provider if you experience any of the following:  difficulty breathing or increased cough     Notify your health care provider if you experience any of the following:  redness, tenderness, or signs of infection (pain, swelling, redness, odor or green/yellow discharge around incision site)     Notify your health care provider if you experience any of the following:  severe uncontrolled pain     Notify your health care provider if you experience any of the following:  persistent nausea and vomiting or diarrhea     Notify your health care provider if you experience any of the following:  temperature >100.4     Activity as tolerated        Follow-up Information       Rambo Andrade MD Follow up in 1 week(s).    Specialty: Obstetrics and Gynecology  Why: For Post-Op Visit  Contact information:  [address]  [address]  [place] [address]  [phone]                              Rambo Andrade M.D.  OB/GYN  [facility]           [1]   Patient Active Problem List  Diagnosis    Encounter for supervision of normal pregnancy in third trimester    History of  section    Repeat low transverse  delivery

## 2025-05-02 ENCOUNTER — HOSPITAL ENCOUNTER (EMERGENCY)
Facility: HOSPITAL | Age: 25
Discharge: HOME | End: 2025-05-02
Attending: EMERGENCY MEDICINE
Payer: MEDICAID

## 2025-05-02 VITALS
RESPIRATION RATE: 16 BRPM | WEIGHT: 184 LBS | DIASTOLIC BLOOD PRESSURE: 81 MMHG | HEIGHT: 57 IN | BODY MASS INDEX: 39.7 KG/M2 | SYSTOLIC BLOOD PRESSURE: 131 MMHG | TEMPERATURE: 99 F | HEART RATE: 76 BPM | OXYGEN SATURATION: 97 %

## 2025-05-02 DIAGNOSIS — R45.89 EMOTIONAL DISTRESS: ICD-10-CM

## 2025-05-02 DIAGNOSIS — Z13.6 SCREENING FOR CARDIOVASCULAR CONDITION: ICD-10-CM

## 2025-05-02 DIAGNOSIS — R52: ICD-10-CM

## 2025-05-02 DIAGNOSIS — R10.30 LOWER ABDOMINAL PAIN: Primary | ICD-10-CM

## 2025-05-02 LAB
ABSOLUTE EOSINOPHIL (OHS): 0.32 K/UL
ABSOLUTE MONOCYTE (OHS): 0.33 K/UL (ref 0.3–1)
ABSOLUTE NEUTROPHIL COUNT (OHS): 4.92 K/UL (ref 1.8–7.7)
ALBUMIN SERPL BCP-MCNC: 3 G/DL (ref 3.5–5.2)
ALP SERPL-CCNC: 133 UNIT/L (ref 40–150)
ALT SERPL W/O P-5'-P-CCNC: 65 UNIT/L (ref 10–44)
ANION GAP (OHS): 15 MMOL/L (ref 8–16)
AST SERPL-CCNC: 70 UNIT/L (ref 11–45)
BACTERIA #/AREA URNS AUTO: ABNORMAL /HPF
BASOPHILS # BLD AUTO: 0.01 K/UL
BASOPHILS NFR BLD AUTO: 0.1 %
BILIRUB SERPL-MCNC: 0.5 MG/DL (ref 0.1–1)
BILIRUB UR QL STRIP.AUTO: NEGATIVE
BIPAP: 0
BUN SERPL-MCNC: 11 MG/DL (ref 6–20)
CALCIUM SERPL-MCNC: 9.1 MG/DL (ref 8.7–10.5)
CHLORIDE SERPL-SCNC: 107 MMOL/L (ref 95–110)
CLARITY UR: CLEAR
CO2 SERPL-SCNC: 21 MMOL/L (ref 23–29)
COLOR UR AUTO: COLORLESS
CREAT SERPL-MCNC: 0.6 MG/DL (ref 0.5–1.4)
ERYTHROCYTE [DISTWIDTH] IN BLOOD BY AUTOMATED COUNT: 14.9 % (ref 11.5–14.5)
FIO2: 21 %
GFR SERPLBLD CREATININE-BSD FMLA CKD-EPI: >60 ML/MIN/1.73/M2
GLUCOSE SERPL-MCNC: 73 MG/DL (ref 70–110)
GLUCOSE UR QL STRIP: NEGATIVE
HCT VFR BLD AUTO: 35.5 % (ref 37–48.5)
HGB BLD-MCNC: 11.2 GM/DL (ref 12–16)
HGB UR QL STRIP: ABNORMAL
HOLD SPECIMEN: NORMAL
IMM GRANULOCYTES # BLD AUTO: 0.04 K/UL (ref 0–0.04)
IMM GRANULOCYTES NFR BLD AUTO: 0.5 % (ref 0–0.5)
KETONES UR QL STRIP: NEGATIVE
LDH SERPL L TO P-CCNC: 2.3 MMOL/L
LEUKOCYTE ESTERASE UR QL STRIP: ABNORMAL
LYMPHOCYTES # BLD AUTO: 2.37 K/UL (ref 1–4.8)
MCH RBC QN AUTO: 27.8 PG (ref 27–31)
MCHC RBC AUTO-ENTMCNC: 31.5 G/DL (ref 32–36)
MCV RBC AUTO: 88 FL (ref 82–98)
MICROSCOPIC COMMENT: ABNORMAL
NITRITE UR QL STRIP: NEGATIVE
NUCLEATED RBC (/100WBC) (OHS): 0 /100 WBC
OHS QRS DURATION: 134 MS
OHS QTC CALCULATION: 472 MS
PH UR STRIP: 8 [PH]
PLATELET # BLD AUTO: 249 K/UL (ref 150–450)
PMV BLD AUTO: 10.4 FL (ref 9.2–12.9)
POC PERFORMED BY: NORMAL
POC TEMPERATURE: 37 C
POTASSIUM SERPL-SCNC: 4.1 MMOL/L (ref 3.5–5.1)
PROCALCITONIN SERPL-MCNC: 0.02 NG/ML
PROT SERPL-MCNC: 7.4 GM/DL (ref 6–8.4)
PROT UR QL STRIP: NEGATIVE
RBC # BLD AUTO: 4.03 M/UL (ref 4–5.4)
RBC #/AREA URNS AUTO: 18 /HPF (ref 0–4)
RELATIVE EOSINOPHIL (OHS): 4 %
RELATIVE LYMPHOCYTE (OHS): 29.7 % (ref 18–48)
RELATIVE MONOCYTE (OHS): 4.1 % (ref 4–15)
RELATIVE NEUTROPHIL (OHS): 61.6 % (ref 38–73)
SODIUM SERPL-SCNC: 143 MMOL/L (ref 136–145)
SP GR UR STRIP: 1
SPECIMEN SOURCE: NORMAL
SQUAMOUS #/AREA URNS AUTO: 1 /HPF
UROBILINOGEN UR STRIP-ACNC: NEGATIVE EU/DL
WBC # BLD AUTO: 7.99 K/UL (ref 3.9–12.7)
WBC #/AREA URNS AUTO: 12 /HPF (ref 0–5)

## 2025-05-02 PROCEDURE — 96374 THER/PROPH/DIAG INJ IV PUSH: CPT

## 2025-05-02 PROCEDURE — 63600175 PHARM REV CODE 636 W HCPCS: Performed by: EMERGENCY MEDICINE

## 2025-05-02 PROCEDURE — 81001 URINALYSIS AUTO W/SCOPE: CPT | Performed by: EMERGENCY MEDICINE

## 2025-05-02 PROCEDURE — 63600175 PHARM REV CODE 636 W HCPCS

## 2025-05-02 PROCEDURE — 99900035 HC TECH TIME PER 15 MIN (STAT)

## 2025-05-02 PROCEDURE — 94761 N-INVAS EAR/PLS OXIMETRY MLT: CPT

## 2025-05-02 PROCEDURE — 83605 ASSAY OF LACTIC ACID: CPT

## 2025-05-02 PROCEDURE — 85025 COMPLETE CBC W/AUTO DIFF WBC: CPT | Performed by: EMERGENCY MEDICINE

## 2025-05-02 PROCEDURE — 25500020 PHARM REV CODE 255: Performed by: EMERGENCY MEDICINE

## 2025-05-02 PROCEDURE — 96361 HYDRATE IV INFUSION ADD-ON: CPT

## 2025-05-02 PROCEDURE — 93005 ELECTROCARDIOGRAM TRACING: CPT

## 2025-05-02 PROCEDURE — 87040 BLOOD CULTURE FOR BACTERIA: CPT | Performed by: EMERGENCY MEDICINE

## 2025-05-02 PROCEDURE — 84145 PROCALCITONIN (PCT): CPT | Performed by: EMERGENCY MEDICINE

## 2025-05-02 PROCEDURE — 87086 URINE CULTURE/COLONY COUNT: CPT | Performed by: EMERGENCY MEDICINE

## 2025-05-02 PROCEDURE — 80053 COMPREHEN METABOLIC PANEL: CPT | Performed by: EMERGENCY MEDICINE

## 2025-05-02 PROCEDURE — 99285 EMERGENCY DEPT VISIT HI MDM: CPT | Mod: 25

## 2025-05-02 PROCEDURE — 96375 TX/PRO/DX INJ NEW DRUG ADDON: CPT

## 2025-05-02 PROCEDURE — 93010 ELECTROCARDIOGRAM REPORT: CPT | Mod: ,,, | Performed by: INTERNAL MEDICINE

## 2025-05-02 RX ORDER — DROPERIDOL 2.5 MG/ML
0.62 INJECTION, SOLUTION INTRAMUSCULAR; INTRAVENOUS
Status: COMPLETED | OUTPATIENT
Start: 2025-05-02 | End: 2025-05-02

## 2025-05-02 RX ORDER — MORPHINE SULFATE 4 MG/ML
4 INJECTION, SOLUTION INTRAMUSCULAR; INTRAVENOUS
Refills: 0 | Status: COMPLETED | OUTPATIENT
Start: 2025-05-02 | End: 2025-05-02

## 2025-05-02 RX ADMIN — IOHEXOL 75 ML: 350 INJECTION, SOLUTION INTRAVENOUS at 12:05

## 2025-05-02 RX ADMIN — MORPHINE SULFATE 4 MG: 4 INJECTION INTRAVENOUS at 12:05

## 2025-05-02 RX ADMIN — DROPERIDOL 0.62 MG: 2.5 INJECTION, SOLUTION INTRAMUSCULAR; INTRAVENOUS at 12:05

## 2025-05-02 RX ADMIN — SODIUM CHLORIDE, POTASSIUM CHLORIDE, SODIUM LACTATE AND CALCIUM CHLORIDE 2505 ML: 600; 310; 30; 20 INJECTION, SOLUTION INTRAVENOUS at 12:05

## 2025-05-02 NOTE — CODE/ RAPID DOCUMENTATION
RAPID RESPONSE NURSE NOTE        Admit Date: 2025  LOS: 0  Code Status: Prior   Date of Consult: 2025  : 2000  Age: 24 y.o.  Weight:   Wt Readings from Last 1 Encounters:   25 83.5 kg (184 lb)     Sex: female  Race: White   Bed: ED :   MRN: [id number]  Time Rapid Response Team page Received: 1344  Time Rapid Response Team at Bedside: 1346  Time Rapid Response Team left Bedside: 1400  Was the patient discharged from an ICU this admission? No  Was the patient discharged from a PACU within last 24 hours? No   Did the patient receive conscious sedation/general anesthesia in last 24 hours? No  Was the patient in the ED within the past 24 hours? No  Was the patient on NIPPV within the past 24 hours? No   Did this progress into an ARC or CPA: No  Attending Physician: DUSTIN  Primary Service: NA    SITUATION    Notified by overhead page.  Reason for alert: transport back to ER   Called to evaluate the patient for transport     BACKGROUND     Why is the patient in the hospital?: Patient admitted to ED but in PICU to be with baby.     Patient has no past medical history on file.    ASSESSMENT/INTERVENTIONS    What did you find: Called to PICU for assistance with patient who is admitted to ED and needed transport back. She was in PICU visiting her baby who was actively dying.     RECOMMENDATIONS    We recommend: ED transfer and evaluation.    PROVIDER ESCALATION    Orders received and case discussed with NA.    FOLLOW UP    Call the Rapid Response Nurse, Yessenia Lopez RN at [phone] for additional questions or concerns.

## 2025-05-02 NOTE — ED NOTES
Patient brought upstairs to see child with Peds cardiology, ICU RN,  and child life. Dr. Lamar aware and states okay for patient to leave at this time. Patient notified ok to come back to ER to finish work up

## 2025-05-02 NOTE — ED PROVIDER NOTES
"Encounter Date: 2025       History     Chief Complaint   Patient presents with    Abdominal Pain     ABD pain x4 days. Denies NVD. Gave birth 4 days ago.      24-year-old female 4 days status post  without any stated complications presents for abdominal pain.  The patient called EMS because of shortness of breath in her  and was brought to the pediatric emergency department.  The child reportedly suffered cardiac arrest and the patient became symptomatic.  She is describing emotional distress and abdominal cramping and sharp pains in the right side of her abdomen.  She is denying any recent fevers or complications with the pregnancy.  She also denies any intervening trauma since her surgery.  She says that she believes the distress is "working up her nerves".  She is denying any chest pain or shortness of breath.  She notes scant dark brown vaginal discharge, but no fatmata vaginal bleeding, foul-smelling discharge, cough, nausea or vomiting.    The history is provided by the patient. The history is limited by a language barrier. A  was used.     Review of patient's allergies indicates:   Allergen Reactions    Cinnamon Rash     History reviewed. No pertinent past medical history.  Past Surgical History:   Procedure Laterality Date     SECTION N/A 2025    Procedure:  SECTION;  Surgeon: Rambo Andrade MD;  Location: [facility] L&D;  Service: OB/GYN;  Laterality: N/A;    OVARIAN CYST SURGERY Left      Family History   Problem Relation Name Age of Onset    Hypertension Maternal Grandmother      Diabetes Maternal Grandmother      Hypertension Maternal Grandfather      Diabetes Maternal Grandfather       Social History[1]  Review of Systems   Constitutional:  Negative for fever.   HENT:  Negative for sore throat.    Respiratory:  Negative for shortness of breath.    Cardiovascular:  Negative for chest pain.   Gastrointestinal:  Positive for abdominal distention " and abdominal pain. Negative for blood in stool and nausea.   Genitourinary:  Positive for pelvic pain and vaginal bleeding. Negative for dysuria.   Musculoskeletal:  Negative for back pain.   Skin:  Negative for rash.   Neurological:  Negative for weakness.   Hematological:  Does not bruise/bleed easily.   Psychiatric/Behavioral:          Anxiety   All other systems reviewed and are negative.      Physical Exam     Initial Vitals [05/02/25 1136]   BP Pulse Resp Temp SpO2   123/75 86 18 97.4 °F (36.3 °C) 99 %      MAP       --         Vitals:    05/02/25 1407 05/02/25 1515 05/02/25 1600 05/02/25 1615   BP: 135/74 131/78 135/83 131/81   BP Location:       Pulse: 76 79 71 76   Resp: 16 16  16   Temp:    98.5 °F (36.9 °C)   TempSrc:       SpO2: 98% 97% 96% 97%   Weight:       Height:          Physical Exam    Nursing note and vitals reviewed.  Constitutional: She appears well-developed and well-nourished. She appears distressed.   HENT:   Head: Normocephalic.   Eyes: Pupils are equal, round, and reactive to light.   Neck: Neck supple.   Cardiovascular:  Normal rate, regular rhythm, normal heart sounds and intact distal pulses.           Pulmonary/Chest: Breath sounds normal. She has no wheezes. She has no rhonchi. She has no rales.   Abdominal: Abdomen is soft. There is abdominal tenderness.   Fundus is palpated slightly deviated to the right and slight bleed above the umbilicus., patient has diffuse lower abdominal tenderness There is no guarding.   Genitourinary:    Genitourinary Comments: Exam deferred     Musculoskeletal:      Cervical back: Neck supple.     Neurological: She is alert and oriented to person, place, and time. She has normal strength. No cranial nerve deficit.   Skin: Skin is warm and dry. Capillary refill takes less than 2 seconds. No rash noted. No erythema. No pallor.   Psychiatric: Her speech is normal and behavior is normal. Judgment and thought content normal. Her mood appears anxious.  Cognition and memory are normal.         ED Course   Procedures  Labs Reviewed   COMPREHENSIVE METABOLIC PANEL - Abnormal       Result Value    Sodium 143      Potassium 4.1      Chloride 107      CO2 21 (*)     Glucose 73      BUN 11      Creatinine 0.6      Calcium 9.1      Protein Total 7.4      Albumin 3.0 (*)     Bilirubin Total 0.5            AST 70 (*)     ALT 65 (*)     Anion Gap 15      eGFR >60     URINALYSIS, REFLEX TO URINE CULTURE - Abnormal    Color, UA Colorless (*)     Appearance, UA Clear      pH, UA 8.0      Spec Grav UA 1.005      Protein, UA Negative      Glucose, UA Negative      Ketones, UA Negative      Bilirubin, UA Negative      Blood, UA 3+ (*)     Nitrites, UA Negative      Urobilinogen, UA Negative      Leukocyte Esterase, UA 1+ (*)    CBC WITH DIFFERENTIAL - Abnormal    WBC 7.99      RBC 4.03      HGB 11.2 (*)     HCT 35.5 (*)     MCV 88      MCH 27.8      MCHC 31.5 (*)     RDW 14.9 (*)     Platelet Count 249      MPV 10.4      Nucleated RBC 0      Neut % 61.6      Lymph % 29.7      Mono % 4.1      Eos % 4.0      Basophil % 0.1      Imm Grans % 0.5      Neut # 4.92      Lymph # 2.37      Mono # 0.33      Eos # 0.32      Baso # 0.01      Imm Grans # 0.04     URINALYSIS MICROSCOPIC - Abnormal    RBC, UA 18 (*)     WBC, UA 12 (*)     Bacteria, UA Rare      Squamous Epithelial Cells, UA 1      Microscopic Comment       PROCALCITONIN - Normal    Procalcitonin 0.02     CULTURE, BLOOD   CULTURE, BLOOD   CULTURE, URINE   CBC W/ AUTO DIFFERENTIAL    Narrative:     The following orders were created for panel order CBC auto differential.  Procedure                               Abnormality         Status                     ---------                               -----------         ------                     CBC with Differential[[id number]]       Abnormal            Final result                 Please view results for these tests on the individual orders.   GREY TOP URINE HOLD    Extra  Tube Hold for add-ons.       EKG Readings: (Independently Interpreted)   Initial Reading: No STEMI. Heart Rate: 79. Other Impression: Normal sinus at 79 with a right bundle-branch block.  Nonspecific ST-T wave abnormalities.  No STEMI     ECG Results              EKG 12-lead (Final result)        Collection Time Result Time QRS Duration OHS QTC Calculation    25 13:04:35 25 14:28:10 134 472                     Final result by Interface, Lab In [facility] (25 14:28:15)                   Narrative:    Test Reason : Z13.6,    Vent. Rate :  79 BPM     Atrial Rate :  79 BPM     P-R Int : 152 ms          QRS Dur : 134 ms      QT Int : 412 ms       P-R-T Axes :  48  88  21 degrees    QTcB Int : 472 ms    Normal sinus rhythm  Right bundle branch block  Abnormal ECG  No previous ECGs available  Confirmed by Andrea Haider ([id number]) on 2025 2:28:08 PM    Referred By: AAAREFERRAL SELF           Confirmed By: Andrea Christopher                                  Imaging Results              US Pelvis Complete Non OB (Final result)  Result time 25 15:28:41      Final result by James Johnson MD (25 15:28:41)                   Impression:      Enlarged uterus and endometrial echo complex measuring 11 mm, within normal limits for recent postpartum state.      Electronically signed by: James Johnson MD  Date:    2025  Time:    15:28               Narrative:    EXAMINATION:  US PELVIS COMPLETE NON OB    CLINICAL HISTORY:  pelvic ultrasound;    TECHNIQUE:  Transabdominal pelvic ultrasound.    COMPARISON:  2025 CT    FINDINGS:  Uterus: Measures 17.2 x 9.1 x 14.0 cm.  Endometrial echo complex measures 11 mm, within normal limits for recent postpartum state.  Postoperative changes of  noted.    Right ovary: Not visualized.    Left ovary: Measures 2.6 x 2.0 x 2.1.  Appearance is unremarkable.  Blood flow is present.    Miscellaneous: Trace free fluid.                                        X-Ray Chest AP Portable (Final result)  Result time 05/02/25 13:03:27      Final result by Chris Varma MD (05/02/25 13:03:27)                   Impression:      See above      Electronically signed by: Chris Varma MD  Date:    05/02/2025  Time:    13:03               Narrative:    EXAMINATION:  XR CHEST AP PORTABLE    CLINICAL HISTORY:  Sepsis;    TECHNIQUE:  Single frontal view of the chest was performed.    COMPARISON:  None    FINDINGS:  Mild cardiomegaly.  The lungs are clear.  No pleural effusion.                                        CT Abdomen Pelvis With IV Contrast NO Oral Contrast (Final result)  Result time 05/02/25 14:04:14      Final result by Juan J Bailey Jr., MD (05/02/25 14:04:14)                   Impression:      1. Recent postoperative appearance of the uterus in this patient with Caesarean section approximately 3 days ago.  Correlate clinically with gynecologic evaluation.  2. Mild urinary bladder wall thickening with few intraluminal air.  Recommend clinical correlation with urinalysis to rule out cystitis.  3. Hepatomegaly with trace of perihepatic and gallbladder fossa free fluid, nonspecific.  4. Additional findings as above.  This report was flagged in Epic as abnormal.    Electronically signed by resident: Caty Da Silva  Date:    05/02/2025  Time:    13:04    Electronically signed by: Juan J Stubbs Jr  Date:    05/02/2025  Time:    14:04               Narrative:    EXAMINATION:  CT ABDOMEN PELVIS WITH IV CONTRAST    CLINICAL HISTORY:  Abdominal pain, acute, nonlocalized    TECHNIQUE:  Low dose axial images, sagittal and coronal reformations were obtained from the lung bases to the pubic symphysis following the IV administration of 75 mL of Omnipaque 350.Oral contrast was not given.    COMPARISON:  Ultrasound obstetric 02/06/2020    FINDINGS:  Lower thorax:    Heart: Normal in size. No pericardial effusion.    Lung Bases: Limited by respiration artifact.  Calcific  granuloma.  No consolidation.  No pleural effusion or pleural thickening.    Liver: Hepatomegaly measure 19.7 cm.  No focal hepatic lesion.    Gallbladder: No calcified gallstones.  Gallbladder is not distended.  No gallbladder wall thickening measuring 2 mm.  Trace of pericholecystic fluid collection.    Bile Ducts: No intra or extrahepatic biliary duct dilatation.    Pancreas: No mass or peripancreatic fat stranding.    Spleen: Normal in size.  No focal lesion.    Adrenals: Unremarkable.    Kidneys/ Ureters: Normal in size and location. Normal enhancement. No hydronephrosis or nephrolithiasis. No ureteral dilatation.    Bladder: Mild diffuse bladder wall thickening, likely exaggerated by incomplete distension.  Punctate intraluminal air foci.    Reproductive organs: Enlarged uterus measuring 15.7 cm with wall thickening and heterogeneously enhancement.  Higher than fluid attenuation and small free fluid in the pelvis.  Soft tissue fullness of the adnexa without discrete lesion or cyst.    Gl/Mesentery/peritoneum and retroperitoneum: Distal esophagus and stomach are unremarkable.  Small bowel is normal in caliber with no evidence of obstruction. No evidence of inflammation or wall thickening.  Appendix is unremarkable.  Colon demonstrates no focal wall thickening.No ascites. Free fluid in the pelvis.  No significant adenopathy.    Abdominal wall: Status post caesarean section with mild soft tissue attenuation and few air abdominal wall air foci.    Vasculature: No significant atherosclerosis or aneurysm. Prominent uterine and adnexal vessels.    Bones: No acute fracture.  No suspicious lytic or sclerotic lesion.                        Preliminary result by Caty Da Silva MD (05/02/25 13:50:21)                   Impression:      1. Postoperative Caesarean section with heterogeneous enhancement uterine wall thickening associated with higher than fluid attenuation free fluid in the pelvis.  Recommend further  evaluation with pelvic ultrasound to rule out underlying gynecological abnormality.  2. Mild urinary bladder wall thickening with few intraluminal air.  Recommend clinical correlation with urinalysis to rule out cystitis.  3. Hepatomegaly with trace of perihepatic and gallbladder fossa free fluid, can correlate with underlying hepatic edema versus reactive free fluid.  4. Additional findings as above.  This report was flagged in Epic as abnormal.    Electronically signed by resident: Caty Da Silva  Date:    05/02/2025  Time:    13:04                 Narrative:    EXAMINATION:  CT ABDOMEN PELVIS WITH IV CONTRAST    CLINICAL HISTORY:  Abdominal pain, acute, nonlocalized;    TECHNIQUE:  Low dose axial images, sagittal and coronal reformations were obtained from the lung bases to the pubic symphysis following the IV administration of 75 mL of Omnipaque 350.Oral contrast was not given.    COMPARISON:  Ultrasound obstetric 02/06/2020    FINDINGS:  Lower thorax:    Heart: Normal in size. No pericardial effusion.    Lung Bases: Limited by respiration artifact.  Calcific granuloma.  No consolidation.  No pleural effusion or pleural thickening.    Liver: Hepatomegaly measure 19.7 cm.  No focal hepatic lesion.    Gallbladder: No calcified gallstones.  Gallbladder is not distended.  No gallbladder wall thickening measuring 2 mm.  Trace of pericholecystic fluid collection.    Bile Ducts: No intra or extrahepatic biliary duct dilatation.    Pancreas: No mass or peripancreatic fat stranding.    Spleen: Normal in size.  No focal lesion.    Adrenals: Unremarkable.    Kidneys/ Ureters: Normal in size and location. Normal enhancement. No hydronephrosis or nephrolithiasis. No ureteral dilatation.    Bladder: Mild diffuse bladder wall thickening, likely exaggerated by incomplete distension.  Punctate intraluminal air foci.    Reproductive organs: Enlarged uterus measuring 15.7 cm with wall thickening and heterogeneously enhancement.   Higher than fluid attenuation and small free fluid in the pelvis.  Soft tissue fullness of the adnexa without discrete lesion or cyst.    Gl/Mesentery/peritoneum and retroperitoneum: Distal esophagus and stomach are unremarkable.  Small bowel is normal in caliber with no evidence of obstruction. No evidence of inflammation or wall thickening.  Appendix is unremarkable.  Colon demonstrates no focal wall thickening.No ascites. Free fluid in the pelvis.  No significant adenopathy.    Abdominal wall: Status post caesarean section with mild soft tissue attenuation and few air abdominal wall air foci.    Vasculature: No significant atherosclerosis or aneurysm. Prominent uterine and adnexal vessels.    Bones: No acute fracture.  No suspicious lytic or sclerotic lesion.                                       Medications   lactated ringers bolus 2,505 mL (0 mLs Intravenous Stopped 25 1307)   droPERidol injection 0.625 mg (0.625 mg Intravenous Given 25 1237)   iohexoL (OMNIPAQUE 350) injection 75 mL (75 mLs Intravenous Given 25 1222)   morphine injection 4 mg (4 mg Intravenous Given 25 1256)     Medical Decision Making  See ED course for remainder of care    Problems Addressed:  Emotional distress: acute illness or injury that poses a threat to life or bodily functions  Lower abdominal pain: acute illness or injury that poses a threat to life or bodily functions  Pain during postpartum period as sequela of  section: acute illness or injury that poses a threat to life or bodily functions    Amount and/or Complexity of Data Reviewed  Independent Historian: spouse     Details: States the patient has had abdominal pain for couple of days now.  Labs: ordered. Decision-making details documented in ED Course.  Radiology: ordered and independent interpretation performed. Decision-making details documented in ED Course.     Details: No obvious obstruction or free air noted  ECG/medicine tests: ordered and  independent interpretation performed. Decision-making details documented in ED Course.     Details: No STEMI  Discussion of management or test interpretation with external provider(s): Care of the patient was transferred to  at 1500 with ultrasound results pending.  Patient's abdominal pain is much improved now as I anticipate patient will be able to be discharged.    Risk  Prescription drug management.  Diagnosis or treatment significantly limited by social determinants of health.  Risk Details: Differential diagnosis includes bowel obstruction, abdominal abscess, ischemic bowel, surgical complication, anxiety, etc.              Attending Attestation:   Physician Attestation Statement for Resident:  As the supervising MD   Physician Attestation Statement: I have personally seen and examined this patient.   I agree with the above history.  -: Patient's child was transferred to our pediatric emergency department with hypoglycemia, altered mental status, and ultimately a cardiac arrest.  Patient is   As the supervising MD I agree with the above PE.   -: Diffuse abdominal tenderness with guarding.  No rebound appreciated.  Patient is a very anxious   As the supervising MD I agree with the above treatment, course, plan, and disposition.   -: Patient is ED course was abnormal-she declined pelvic exam due to pain in the complication of being distracted with her critically ill infant daughter.  Patient left the department after CT to go to the PICU to hold her child while they with a true attempts at resuscitation.  It appears for now the child is critically ill however has ROSC.  The patient and her  are very distraught as expected   I have reviewed and agree with the residents interpretation of the following: lab data, x-rays and CT scans.                 ED Course as of 25 1619   Fri May 02, 2025   1136 24-year-old  female in emotional distress.  Vital signs are all within normal limits.   Patient is overall nontoxic and well-appearing on exam.  Review of the chart shows patient was GBS negative this was her 2nd .  Differential includes but is not limited to postoperative complication versus emotional distress versus infection, also considered preeclampsia, but patient has normal blood pressure. [BP]   1319 We had a conversation with the MICU attending who said that the patient's child is currently dying upstairs and they believe that it would be best for the mother to be there.  CBC is currently reassuring, union nothing overtly concerning on the nerve CT scan.  We recommended that she return to the emergency department to continue her workup and evaluation, but agree with bringing the mother to the bedside. [BP]   1321 WBC, UA(!): 12 [ASTER]   1321 WBC: 7.99 [ASTER]   1321 Hemoglobin(!): 11.2 [ASTER]   1321 Hematocrit(!): 35.5 [ASTER]   1321 Platelet Count: 249 [ASTER]   1321 Sodium: 143 [ASTER]   1321 BUN: 11 [ASTER]   1321 Creatinine: 0.6 [ASTER]   1321 BILIRUBIN TOTAL: 0.5 [ASTER]   1321 Albumin(!): 3.0 [ASTER]   1321 ALP: 133 [ASTER]   1321 AST(!): 70 [ASTER]   1321 ALT(!): 65 [ASTER]   1328 RBC, UA(!): 18 [BP]   1329 WBC, UA(!): 12  Would expect with lochia after c section [BP]   1340 On reassessment on returned to the emergency department, the patient was resting calmly she reports her pain is 4/10.  I acknowledged the difficulty of her circumstances and offered emotional support.  Patient was agreeable to abdominal ultrasound to further evaluate for postoperative complications, but overall workup and reassessment are reassuring.  Patient has no leukocytosis, procalcitonin is negative, she has mild LFT elevations, but hemolysis was noted on labs, UA is consistent with postpartum lochia.   [BP]   1404 CT Abdomen Pelvis With IV Contrast NO Oral Contrast(!)  Impression:     1. Recent postoperative appearance of the uterus in this patient with Caesarean section approximately 3 days ago.  Correlate clinically with gynecologic  evaluation.  2. Mild urinary bladder wall thickening with few intraluminal air.  Recommend clinical correlation with urinalysis to rule out cystitis.  3. Hepatomegaly with trace of perihepatic and gallbladder fossa free fluid, nonspecific.  4. Additional findings as above.  This report was flagged in Epic as abnormal.   [BP]   1500 The patient was ultimately signed out to the oncoming team pending pelvic ultrasound and OB consultation with likely discharge if normal. [BP]      ED Course User Index  [BP] Rolando Paul MD  [ASTER] Jose Lamar MD                           Clinical Impression:  Final diagnoses:  [Z13.6] Screening for cardiovascular condition  [R10.30] Lower abdominal pain (Primary)  [R45.89] Emotional distress  [O90.89, R52] Pain during postpartum period as sequela of  section       Condition-stable            Rolando Paul MD  Resident  25 1619         [1]   Social History  Tobacco Use    Smoking status: Never     Passive exposure: Never    Smokeless tobacco: Never   Substance Use Topics    Alcohol use: Not Currently    Drug use: Never        Jose Lamar MD  [phone]

## 2025-05-02 NOTE — RESPIRATORY THERAPY
"RAPID RESPONSE RESPIRATORY THERAPY NOTE             Code Status: Prior   : 2000  Bed: ED :   MRN: [id number]  Time page Received: 34673  Time Rapid Response RT at Bedside: 1343  Time Rapid Response RT left Bedside: 1400    SITUATION    Evaluated patient for: Transport back to ED    BACKGROUND    Why is the patient in the hospital?: <principal problem not specified>    Patient has no past medical history on file.    24 Hours Vitals Range:  Temp:  [97.4 °F (36.3 °C)-98.8 °F (37.1 °C)]   Pulse:  [77-94]   Resp:  [16-20]   BP: (115-123)/(61-75)   SpO2:  [99 %-100 %]     Labs:    Recent Labs     25  1240      K 4.1      CO2 21*   BUN 11   CREATININE 0.6   GLU 73        No results for input(s): "PH", "PCO2", "PO2", "HCO3", "POCSATURATED", "BE" in the last 72 hours.    ASSESSMENT/INTERVENTIONS  Patient alert and oriented upon arrival. Requested transport by [facility] for current medical state. Transported to room with no adverse events.    Last Vitals: Temp: 98.8 °F (37.1 °C) ( 1240)  Pulse: 90 ( 1343)  Resp: 16 ( 1343)  BP: 115/61 ( 1300)  SpO2: 99 % ( 1343)  Level of Consciousness: Level of Consciousness (AVPU): alert  Respiratory Effort: Respiratory Effort: Normal, Unlabored  Expansion/Accessory Muscle Usage: Expansion/Accessory Muscles/Retractions: no use of accessory muscles, no retractions, expansion symmetric  All Lung Field Breath Sounds:    O2 Device/Concentration: RA  NIPPV: No Surgical airway: No Vent: No  ETCO2 monitored:    Ambu at bedside: Yes    Active Orders   Respiratory Care    POCT Venous Blood Gas (Lactate) #2     Frequency: Once     Number of Occurrences: 1 Occurrences     Order Comments: This test should be used for VBGs.  If using this order for other tests (K, creatinine, HCT, PT/INR, lactate etc)  ONLY do so in the case of an emergency or rapid response.Notify Physician if: see parameters below.         Order Questions:      Component: Blood Gas    "   Component: Lactate    Pulse Oximetry Continuous     Frequency: Continuous     Number of Occurrences: Until Specified       RECOMMENDATIONS  ?  We recommend: RRT Recs: Continue POC per primary team.      FOLLOW-UP    Disposition: Tx to ER bed ED12.    Please call back the Rapid Response RT, Virginia Montano RRT at x [phone] for any questions or concerns.

## 2025-05-03 ENCOUNTER — HOSPITAL ENCOUNTER (EMERGENCY)
Facility: HOSPITAL | Age: 25
Discharge: HOME | End: 2025-05-03
Attending: EMERGENCY MEDICINE
Payer: MEDICAID

## 2025-05-03 VITALS
RESPIRATION RATE: 18 BRPM | BODY MASS INDEX: 39.7 KG/M2 | HEIGHT: 57 IN | WEIGHT: 184 LBS | DIASTOLIC BLOOD PRESSURE: 75 MMHG | HEART RATE: 70 BPM | OXYGEN SATURATION: 98 % | TEMPERATURE: 98 F | SYSTOLIC BLOOD PRESSURE: 135 MMHG

## 2025-05-03 DIAGNOSIS — N64.4 BREAST PAIN: Primary | ICD-10-CM

## 2025-05-03 LAB — BACTERIA UR CULT: NORMAL

## 2025-05-03 PROCEDURE — 99281 EMR DPT VST MAYX REQ PHY/QHP: CPT

## 2025-05-03 NOTE — ED NOTES
LOC: The patient is awake, alert, and oriented to self, place, time, and situation. Pt is calm and cooperative. Affect is tearful.   utilized.    APPEARANCE: Patient resting uncomfortably, reporting discomfort to breast and abdomen.   Patient is clean and well groomed.    SKIN: The skin is warm and dry; color consistent with ethnicity.  Patient has normal skin turgor and moist mucus membranes.  Pt reporting Breast swollen and painful.     MUSCULOSKELETAL: Patient moving upper and lower extremities without difficulty; denies pain in the extremities or back.  Denies weakness.     RESPIRATORY: Airway is open and patent. Respirations spontaneous, even, easy, and non-labored.  Patient has a normal effort and rate.  No accessory muscle use noted. Denies cough.     CARDIAC: No peripheral edema noted. No complaints of chest pain.     ABDOMEN: Soft and tender to palpation.   5 days ago. Pt  reporting abdominal pain; denies nausea, vomiting, diarrhea, or constipation.    NEUROLOGIC: Eyes open spontaneously.  Behavior appropriate to situation.  Follows commands; facial expression symmetrical.  Purposeful motor response noted; normal sensation in all extremities. Pt denies headache; denies lightheadedness or dizziness; denies visual disturbances; denies loss of balance; denies unilateral weakness.

## 2025-05-03 NOTE — ED PROVIDER NOTES
Encounter Date: 5/3/2025       History     Chief Complaint   Patient presents with    Breast Pain     Seen yest for breast pain,  c section 5 d ago, baby in picu, no way to pump     24-year-old  who is s/p C section on 25 presents to the ED with bilateral breast pain.  Baby is currently in ICU and patient has been unable to breastfeed for the past 2 days.  She is not pumping.  She notes enlarged breast, fullness and milk leakage from breasts bilaterally.  She felt warm last night though denies fever.    The history is provided by the patient. The history is limited by a language barrier. A  was used.     Review of patient's allergies indicates:   Allergen Reactions    Cinnamon Rash     Past Medical History:   Diagnosis Date    H/O  section      Past Surgical History:   Procedure Laterality Date     SECTION N/A 2025    Procedure:  SECTION;  Surgeon: Rambo Andrade MD;  Location: [facility] L&D;  Service: OB/GYN;  Laterality: N/A;    OVARIAN CYST SURGERY Left      Family History   Problem Relation Name Age of Onset    Hypertension Maternal Grandmother      Diabetes Maternal Grandmother      Hypertension Maternal Grandfather      Diabetes Maternal Grandfather       Social History[1]  Review of Systems    Physical Exam     Initial Vitals [25 1514]   BP Pulse Resp Temp SpO2   135/75 70 18 98.3 °F (36.8 °C) 98 %      MAP       --         Physical Exam    Nursing note and vitals reviewed.  Constitutional: She appears well-developed and well-nourished. She is not diaphoretic. No distress.   HENT:   Head: Normocephalic and atraumatic.   Nose: Nose normal.   Eyes: Conjunctivae and EOM are normal.   Neck: Neck supple.   Cardiovascular:  Normal rate.           Pulmonary/Chest: No respiratory distress. Right breast exhibits tenderness. Right breast exhibits no inverted nipple, no nipple discharge and no skin change. Left breast exhibits tenderness. Left breast  exhibits no inverted nipple, no nipple discharge and no skin change.   Bilateral breast tenderness without overlying skin changes   Musculoskeletal:      Cervical back: Neck supple.     Neurological: She is alert and oriented to person, place, and time. Gait normal.   Skin: No rash noted.   Psychiatric: She has a normal mood and affect. Thought content normal.         ED Course   Procedures  Labs Reviewed - No data to display       Imaging Results    None          Medications - No data to display  Medical Decision Making  24-year-old  who is s/p C section on 25 presents to the ED with bilateral breast pain.  Nontoxic appearing. Hemodynamically stable. Afebrile. Exam as above. I will initiate workup and reassess.    Ddx:  Breast engorgement, mastitis, abscess,    I suspect symptoms are secondary to breast milk engorgement.  Patient has been unable to breastfeed for the past 2 days as her baby is in ICU.  She states that she feels like she needs to pump.  Nursing staff discussed pumping with NICU who will assist patient with pumping.  I do not suspect abscess or mastitis as patients symptoms are bilateral.  She is afebrile.  There are no overlying skin changes concerning for infection, erythema or purulent drainage.  Will plan to discharge at this time. Strict ED precautions given to return immediately for new, worsening, or concerning symptoms                                        Clinical Impression:  Final diagnoses:  [N64.4] Breast pain (Primary)          ED Disposition Condition    Discharge Stable          ED Prescriptions    None       Follow-up Information       Follow up With Specialties Details Why Contact Info    [facility] - [facility] Emergency Medicine  If symptoms worsen [address]  [facility] [id number] [phone]               [1]   Social History  Tobacco Use    Smoking status: Never     Passive exposure: Never    Smokeless tobacco: Never   Substance Use Topics     Alcohol use: Not Currently    Drug use: Never        Shyanne Bowen PA-C  05/03/25 [id number]

## 2025-05-03 NOTE — ED NOTES
Pt wishing to breast feed infant.  Peds RN spoke with peds ICU nurse to assist with arranging pump and supplies.

## 2025-05-03 NOTE — ED TRIAGE NOTES
To the ED with c/o sore breast.   5 days ago. Baby is in the peds icu.    Pt wishes to breast feed.  Needs support.

## 2025-05-03 NOTE — DISCHARGE INSTRUCTIONS
Regrese a la unidad de cuidados intensivos con santamaria bebé.  El personal de enfermería le ayudará con la extracción    Regrese al departamento de emergencias de inmediato si presenta síntomas nuevos, preocupantes y que empeoran, incluidos cambios en la piel de los senos, secreción purulenta

## 2025-05-03 NOTE — PROVIDER PROGRESS NOTES - EMERGENCY DEPT.
"Encounter Date: 2025    ED Physician Progress Notes          ED Resident HAND-OFF NOTE:  Milagro Botello is a 24 y.o. female who presented to the ED on 2025, patient C/O abdominal pain. I assumed care of patient from off-going ED physician team patient pending ultrasound pelvis, obstetric consultation.    Briefly, this is a 24-year-old .  She had presented to the emergency department with her child who is in respiratory distress.  Patient is child subsequently had cardiac arrest.  Decision was made to terminate efforts but child was still alive.  During these events, patient started experiencing abdominal pain.  Workup has been consistent with normal post  course.  Patient's pain has been controlled but patient is understandably in significant emotional distress.    On my evaluation, Milagro Botello appears well, hemodynamically stable and in NAD. Thus far, Milagro Botello has received:  Medications   lactated ringers bolus 2,505 mL (0 mLs Intravenous Stopped 25 1307)   droPERidol injection 0.625 mg (0.625 mg Intravenous Given 25 1237)   iohexoL (OMNIPAQUE 350) injection 75 mL (75 mLs Intravenous Given 25 1222)   morphine injection 4 mg (4 mg Intravenous Given 25 1256)       /81   Pulse 76   Temp 98.5 °F (36.9 °C)   Resp 16   Ht 4' 9" (1.448 m)   Wt 83.5 kg (184 lb)   LMP 2024 (Approximate)   SpO2 97%   Breastfeeding Yes   BMI 39.82 kg/m²         Disposition: I anticipate patient will depend on ultrasound results.  ______________________  Nancie Ferrer MD   Emergency Medicine Resident      UPDATE:       Ultrasound as expected post  discussed patient's case with obstetrics team.  States that patient is appropriate for discharge if she is not experiencing abdominal pain and her dressing is not saturated with blood.  Upon reassessment, patient's pain has improved, dressings are dry.  Extensively discussed strict return " precautions, patient and family voiced understanding.  Stress that patient may return to the emergency department at any time for re-evaluation and treatment.  She is hemodynamically stable, no evidence of acute pathology, she is appropriate for discharge at this time.      :  Screening for cardiovascular condition  Lower abdominal pain (Primary)  Emotional distress  Pain during postpartum period as sequela of  section

## 2025-05-06 ENCOUNTER — HOSPITAL ENCOUNTER (EMERGENCY)
Facility: HOSPITAL | Age: 25
Discharge: HOME | End: 2025-05-06
Attending: EMERGENCY MEDICINE
Payer: MEDICAID

## 2025-05-06 VITALS
HEART RATE: 68 BPM | HEIGHT: 57 IN | SYSTOLIC BLOOD PRESSURE: 112 MMHG | RESPIRATION RATE: 16 BRPM | BODY MASS INDEX: 39.7 KG/M2 | TEMPERATURE: 99 F | WEIGHT: 184 LBS | OXYGEN SATURATION: 98 % | DIASTOLIC BLOOD PRESSURE: 60 MMHG

## 2025-05-06 DIAGNOSIS — F43.0 STRESS REACTION: ICD-10-CM

## 2025-05-06 DIAGNOSIS — R55 NEAR SYNCOPE: Primary | ICD-10-CM

## 2025-05-06 LAB
ABSOLUTE EOSINOPHIL (OHS): 0.18 K/UL
ABSOLUTE MONOCYTE (OHS): 0.41 K/UL (ref 0.3–1)
ABSOLUTE NEUTROPHIL COUNT (OHS): 4.53 K/UL (ref 1.8–7.7)
ALBUMIN SERPL BCP-MCNC: 3.1 G/DL (ref 3.5–5.2)
ALP SERPL-CCNC: 110 UNIT/L (ref 40–150)
ALT SERPL W/O P-5'-P-CCNC: 24 UNIT/L (ref 10–44)
ANION GAP (OHS): 10 MMOL/L (ref 8–16)
AST SERPL-CCNC: 19 UNIT/L (ref 11–45)
BASOPHILS # BLD AUTO: 0.01 K/UL
BASOPHILS NFR BLD AUTO: 0.1 %
BILIRUB SERPL-MCNC: 0.3 MG/DL (ref 0.1–1)
BNP SERPL-MCNC: 16 PG/ML (ref 0–99)
BUN SERPL-MCNC: 17 MG/DL (ref 6–20)
CALCIUM SERPL-MCNC: 9.1 MG/DL (ref 8.7–10.5)
CHLORIDE SERPL-SCNC: 108 MMOL/L (ref 95–110)
CO2 SERPL-SCNC: 23 MMOL/L (ref 23–29)
CREAT SERPL-MCNC: 0.7 MG/DL (ref 0.5–1.4)
ERYTHROCYTE [DISTWIDTH] IN BLOOD BY AUTOMATED COUNT: 14.5 % (ref 11.5–14.5)
GFR SERPLBLD CREATININE-BSD FMLA CKD-EPI: >60 ML/MIN/1.73/M2
GLUCOSE SERPL-MCNC: 82 MG/DL (ref 70–110)
HCT VFR BLD AUTO: 37.2 % (ref 37–48.5)
HCV AB SERPL QL IA: NORMAL
HGB BLD-MCNC: 11.9 GM/DL (ref 12–16)
IMM GRANULOCYTES # BLD AUTO: 0.02 K/UL (ref 0–0.04)
IMM GRANULOCYTES NFR BLD AUTO: 0.3 % (ref 0–0.5)
LYMPHOCYTES # BLD AUTO: 1.86 K/UL (ref 1–4.8)
MCH RBC QN AUTO: 28.2 PG (ref 27–31)
MCHC RBC AUTO-ENTMCNC: 32 G/DL (ref 32–36)
MCV RBC AUTO: 88 FL (ref 82–98)
NUCLEATED RBC (/100WBC) (OHS): 0 /100 WBC
PLATELET # BLD AUTO: 320 K/UL (ref 150–450)
PMV BLD AUTO: 10 FL (ref 9.2–12.9)
POTASSIUM SERPL-SCNC: 3.9 MMOL/L (ref 3.5–5.1)
PROT SERPL-MCNC: 6.8 GM/DL (ref 6–8.4)
RBC # BLD AUTO: 4.22 M/UL (ref 4–5.4)
RELATIVE EOSINOPHIL (OHS): 2.6 %
RELATIVE LYMPHOCYTE (OHS): 26.5 % (ref 18–48)
RELATIVE MONOCYTE (OHS): 5.8 % (ref 4–15)
RELATIVE NEUTROPHIL (OHS): 64.7 % (ref 38–73)
SODIUM SERPL-SCNC: 141 MMOL/L (ref 136–145)
TROPONIN I SERPL HS-MCNC: <3 NG/L
WBC # BLD AUTO: 7.01 K/UL (ref 3.9–12.7)

## 2025-05-06 PROCEDURE — 85025 COMPLETE CBC W/AUTO DIFF WBC: CPT | Performed by: PHYSICIAN ASSISTANT

## 2025-05-06 PROCEDURE — 86803 HEPATITIS C AB TEST: CPT | Performed by: PHYSICIAN ASSISTANT

## 2025-05-06 PROCEDURE — 25000003 PHARM REV CODE 250: Performed by: PHYSICIAN ASSISTANT

## 2025-05-06 PROCEDURE — 84484 ASSAY OF TROPONIN QUANT: CPT | Performed by: PHYSICIAN ASSISTANT

## 2025-05-06 PROCEDURE — 63600175 PHARM REV CODE 636 W HCPCS: Performed by: PHYSICIAN ASSISTANT

## 2025-05-06 PROCEDURE — 83880 ASSAY OF NATRIURETIC PEPTIDE: CPT | Performed by: PHYSICIAN ASSISTANT

## 2025-05-06 PROCEDURE — 99284 EMERGENCY DEPT VISIT MOD MDM: CPT | Mod: 25

## 2025-05-06 PROCEDURE — 96374 THER/PROPH/DIAG INJ IV PUSH: CPT

## 2025-05-06 PROCEDURE — 80053 COMPREHEN METABOLIC PANEL: CPT | Performed by: EMERGENCY MEDICINE

## 2025-05-06 PROCEDURE — 96361 HYDRATE IV INFUSION ADD-ON: CPT

## 2025-05-06 PROCEDURE — 93005 ELECTROCARDIOGRAM TRACING: CPT

## 2025-05-06 PROCEDURE — 93010 ELECTROCARDIOGRAM REPORT: CPT | Mod: ,,, | Performed by: INTERNAL MEDICINE

## 2025-05-06 PROCEDURE — 99900035 HC TECH TIME PER 15 MIN (STAT)

## 2025-05-06 RX ORDER — LORAZEPAM 1 MG/1
1 TABLET ORAL
Status: COMPLETED | OUTPATIENT
Start: 2025-05-06 | End: 2025-05-06

## 2025-05-06 RX ORDER — ONDANSETRON HYDROCHLORIDE 2 MG/ML
4 INJECTION, SOLUTION INTRAVENOUS
Status: COMPLETED | OUTPATIENT
Start: 2025-05-06 | End: 2025-05-06

## 2025-05-06 RX ORDER — ONDANSETRON 4 MG/1
4 TABLET, ORALLY DISINTEGRATING ORAL EVERY 6 HOURS PRN
Qty: 15 TABLET | Refills: 0 | Status: SHIPPED | OUTPATIENT
Start: 2025-05-06

## 2025-05-06 RX ORDER — LORAZEPAM 1 MG/1
1 TABLET ORAL EVERY 6 HOURS PRN
Qty: 10 TABLET | Refills: 0 | Status: SHIPPED | OUTPATIENT
Start: 2025-05-06

## 2025-05-06 RX ADMIN — LORAZEPAM 1 MG: 1 TABLET ORAL at 04:05

## 2025-05-06 RX ADMIN — ONDANSETRON 4 MG: 2 INJECTION INTRAMUSCULAR; INTRAVENOUS at 04:05

## 2025-05-06 RX ADMIN — SODIUM CHLORIDE, POTASSIUM CHLORIDE, SODIUM LACTATE AND CALCIUM CHLORIDE 500 ML: 600; 310; 30; 20 INJECTION, SOLUTION INTRAVENOUS at 04:05

## 2025-05-06 NOTE — ED NOTES
Patient states she was feeling bad while she was with her baby, reports headache, pain at her c section, went for a walk felt like she was passing out and fainting, states the people rakan red shirt came\, states headache since yesterday, reports pain in back where you had anesthesia

## 2025-05-06 NOTE — ED NOTES
Patient identifiers verified and correct for Ms Jonah Botello  C/C: Headache, near syncope, SEE NN  APPEARANCE: awake and alert in NAD. PAIN  10/10 emotional  SKIN: warm, dry and intact. No breakdown or bruising.  MUSCULOSKELETAL: Patient moving all extremities spontaneously, no obvious swelling or deformities noted. Ambulates independently.  RESPIRATORY: reports shortness of breath.Respirations unlabored.   CARDIAC: Positive CP, 2+ distal pulses; no peripheral edema  ABDOMEN: S/ND/NT, Denies nausea  : voids spontaneously, denies difficulty  Neurologic: AAO x 4; follows commands equal strength in all extremities; denies numbness/tingling. Denies dizziness  Denies new weakness, reports anxiety

## 2025-05-06 NOTE — CODE/ RAPID DOCUMENTATION
RAPID RESPONSE NURSE NOTE        Admit Date: (Not on file)  LOS: 0  Code Status: Prior   Date of Consult: 2025  : 2000  Age: 24 y.o.  Weight:   Wt Readings from Last 1 Encounters:   25 83.5 kg (184 lb)     Sex: female  Race: White   Bed: Room/bed info not found:   MRN: [id number]  Time Rapid Response Team page Received: 1510  Time Rapid Response Team at Bedside: 1511  Time Rapid Response Team left Bedside: 1525  Was the patient discharged from an ICU this admission? No  Was the patient discharged from a PACU within last 24 hours? No   Did the patient receive conscious sedation/general anesthesia in last 24 hours? No  Was the patient in the ED within the past 24 hours? No  Was the patient on NIPPV within the past 24 hours? No   Did this progress into an ARC or CPA: No  Attending Physician: DSUTIN  Primary Service: NA    SITUATION    Notified by overhead page.  Reason for alert: syncopal episode  Called to evaluate the patient for Circulatory    BACKGROUND     Why is the patient in the hospital?: here with daughter who is patient in the PICU    Patient has a past medical history of H/O  section.    ASSESSMENT/INTERVENTIONS    What did you find: Patient awake and alert, maintaining airway with palpable pulse    RECOMMENDATIONS    We recommend: ED transfer and evaluation.    PROVIDER ESCALATION    Orders received and case discussed with NA.    FOLLOW UP    Call the Rapid Response Nurse, Ariane Mock RN at [phone] for additional questions or concerns.

## 2025-05-06 NOTE — ED PROVIDER NOTES
Encounter Date: 2025       History     Chief Complaint   Patient presents with    Nausea     Almost passed out, nausea, vomiting, c section incision hurt,     24 year old  female with no significant past medical history presents for episode of lightheadedness with nausea that occurred prior to arrival.  She underwent  2025 and her baby suffered respiratory and cardiac arrest 2025 and is currently in the PICU.  She has been extremely anxious and stressed due to the condition of her baby and has not been able to rest and has not been eating.  She got some bad news about her baby today, went up to go take a walk and started to feel lightheaded with chest discomfort and was brought to the emergency room by her uncle who was concerned about her condition.  States that she is physically feeling better now but feels very anxious.  She reports some pain at her  incision, this feels worse than her prior 2 C-sections.  She is still having some lochia.  She endorses some bilateral leg swelling, denies orthopnea or shortness of breath.  She is primarily [place] speaking, an  used for this interview.      Review of patient's allergies indicates:   Allergen Reactions    Cinnamon Rash     Past Medical History:   Diagnosis Date    H/O  section      Past Surgical History:   Procedure Laterality Date     SECTION N/A 2025    Procedure:  SECTION;  Surgeon: Rambo Andrade MD;  Location: [facility] L&D;  Service: OB/GYN;  Laterality: N/A;    OVARIAN CYST SURGERY Left      Family History   Problem Relation Name Age of Onset    Hypertension Maternal Grandmother      Diabetes Maternal Grandmother      Hypertension Maternal Grandfather      Diabetes Maternal Grandfather       Social History[1]  Review of Systems    Physical Exam     Initial Vitals [25 1532]   BP Pulse Resp Temp SpO2   (!) 119/57 79 18 98.8 °F (37.1 °C) 98 %      MAP       --         Physical  Exam    Nursing note and vitals reviewed.  Constitutional: She appears well-developed and well-nourished. She is not diaphoretic. No distress.   Tearful   HENT:   Head: Normocephalic and atraumatic.   Cardiovascular:  Normal rate, regular rhythm, normal heart sounds and intact distal pulses.     Exam reveals no gallop and no friction rub.       No murmur heard.  Pulmonary/Chest: Breath sounds normal. No respiratory distress. She has no wheezes. She has no rhonchi. She has no rales. She exhibits no tenderness.   Abdominal: Abdomen is soft. Bowel sounds are normal. She exhibits no distension and no mass. There is no abdominal tenderness.   Well-healing abdominal incision.  There is diffuse mild tenderness There is no rebound and no guarding.   Musculoskeletal:         General: Normal range of motion.     Neurological: She is alert and oriented to person, place, and time.   Skin: Skin is warm and dry.   Psychiatric: She has a normal mood and affect.         ED Course   Procedures  Labs Reviewed   CBC WITH DIFFERENTIAL - Abnormal       Result Value    WBC 7.01      RBC 4.22      HGB 11.9 (*)     HCT 37.2      MCV 88      MCH 28.2      MCHC 32.0      RDW 14.5      Platelet Count 320      MPV 10.0      Nucleated RBC 0      Neut % 64.7      Lymph % 26.5      Mono % 5.8      Eos % 2.6      Basophil % 0.1      Imm Grans % 0.3      Neut # 4.53      Lymph # 1.86      Mono # 0.41      Eos # 0.18      Baso # 0.01      Imm Grans # 0.02     COMPREHENSIVE METABOLIC PANEL - Abnormal    Sodium 141      Potassium 3.9      Chloride 108      CO2 23      Glucose 82      BUN 17      Creatinine 0.7      Calcium 9.1      Protein Total 6.8      Albumin 3.1 (*)     Bilirubin Total 0.3            AST 19      ALT 24      Anion Gap 10      eGFR >60     HEPATITIS C ANTIBODY - Normal    Hep C Ab Interp Non-Reactive     TROPONIN I HIGH SENSITIVITY - Normal    Troponin High Sensitive <3     B-TYPE NATRIURETIC PEPTIDE - Normal    BNP 16      CBC W/ AUTO DIFFERENTIAL    Narrative:     The following orders were created for panel order CBC auto differential.  Procedure                               Abnormality         Status                     ---------                               -----------         ------                     CBC with Differential[[id number]]       Abnormal            Final result                 Please view results for these tests on the individual orders.   HEP C VIRUS HOLD SPECIMEN     EKG Readings: (Independently Interpreted)   Initial Reading: No STEMI. Rhythm: Normal Sinus Rhythm. Heart Rate: 68. Ectopy: No Ectopy. Conduction: RBBB. ST Segments: Normal ST Segments. Clinical Impression: Normal Sinus Rhythm       Imaging Results    None          Medications   lactated ringers bolus 500 mL (0 mLs Intravenous Stopped 25)   LORazepam tablet 1 mg (1 mg Oral Given 25)   ondansetron injection 4 mg (4 mg Intravenous Given 25)     Medical Decision Making  24-year-old female presenting for episode of presyncope earlier today.  Her vitals are normal, she appears upset but nontoxic.    Differential diagnosis:  Suspect this may be due to fatigue, poor sleep, stress due to her critically ill   Anemia felt unlikely   Dysrhythmia   Electrolyte derangement  Doubt postpartum cardiomyopathy-no shortness of breath, lung CTA    Will check labs, give anxiolytics reassess.    Workup is reassuring.  Patient is feeling much better after anxiolytics.  Will discharge with instructions to follow up with PCP and obstetrician and return to the ED for worsening symptoms. Stressed the importance of follow-up, strict ED return precautions given.  Patient voiced understanding and is comfortable with discharge. I discussed this patient with my supervising physician.        Amount and/or Complexity of Data Reviewed  Labs: ordered. Decision-making details documented in ED Course.  ECG/medicine tests: ordered and independent  interpretation performed.    Risk  Prescription drug management.               ED Course as of 05/06/25 1913 Tue May 06, 2025   1717 Hemoglobin(!): 11.9 [CC]   1732 Troponin I High Sensitivity: <3 [CC]   1732 BNP: 16 [CC]   1806 Patient reports feeling much better after therapy [CC]      ED Course User Index  [CC] Charissa Le PA-C                           Clinical Impression:  Final diagnoses:  [R55] Near syncope (Primary)  [F43.0] Stress reaction          ED Disposition Condition    Discharge Stable          ED Prescriptions       Medication Sig Dispense Start Date End Date Auth. Provider    LORazepam (ATIVAN) 1 MG tablet Take 1 tablet (1 mg total) by mouth every 6 (six) hours as needed for Anxiety. 10 tablet 5/6/2025 -- Charissa Le PA-C    ondansetron (ZOFRAN-ODT) 4 MG TbDL Take 1 tablet (4 mg total) by mouth every 6 (six) hours as needed (Nausea). 15 tablet 5/6/2025 -- Charissa Le PA-C          Follow-up Information       Follow up With Specialties Details Why Contact [facility] - Family Family Medicine Schedule an appointment as soon as possible for a visit in 1 week  [address] [facility] [address]  [phone]      [facility] - Emergency Dept Emergency Medicine Go to  If symptoms worsen [address] [facility] [id number] [phone]               [1]   Social History  Tobacco Use    Smoking status: Never     Passive exposure: Never    Smokeless tobacco: Never   Substance Use Topics    Alcohol use: Not Currently    Drug use: Never        Charissa Le PA-C  05/06/25 1913

## 2025-05-06 NOTE — RESPIRATORY THERAPY
"RAPID RESPONSE RESPIRATORY THERAPY NOTE             Code Status: Prior   : 2000  Bed: Room/bed info not found:   MRN: [id number]  Time page Received: 1510  Time Rapid Response RT at Bedside: 1512  Time Rapid Response RT left Bedside: 1530    SITUATION    Evaluated patient for: Pain    BACKGROUND    Why is the patient in the hospital?: <principal problem not specified>    Patient has a past medical history of H/O  section.    24 Hours Vitals Range:  Temp:  [98.8 °F (37.1 °C)]   Pulse:  [79]   Resp:  [18]   BP: (119)/(57)   SpO2:  [98 %]     Labs:    No results for input(s): "NA", "K", "CL", "CO2", "BUN", "CREATININE", "GLU", "PHOS", "MG" in the last 72 hours.    Invalid input(s): "CMP", "TBIL"     No results for input(s): "PH", "PCO2", "PO2", "HCO3", "POCSATURATED", "BE" in the last 72 hours.    ASSESSMENT/INTERVENTIONS  Patient alert and oriented. No respiratory concerns at this time.    Last Vitals: Temp: 98.8 °F (37.1 °C) (1532)  Pulse: 79 ( 153)  Resp: 18 (1532)  BP: 119/57 (1532)  SpO2: 98 % (1532)  Level of Consciousness: Level of Consciousness (AVPU): alert  Respiratory Effort: Respiratory Effort: Normal, Unlabored  Expansion/Accessory Muscle Usage: Expansion/Accessory Muscles/Retractions: no use of accessory muscles, no retractions, expansion symmetric  All Lung Field Breath Sounds:    O2 Device/Concentration: RA  NIPPV: No Surgical airway: No Vent: No  ETCO2 monitored:    Ambu at bedside: Yes    Active Orders   There are no active orders of the following types: Respiratory Care.       RECOMMENDATIONS  ?  We recommend: RRT Recs: Continue POC per primary team.    FOLLOW-UP    Disposition: Tx to ER bed WR.    Please call back the Rapid Response RT[place], RRT at x [phone] for any questions or concerns.              "

## 2025-05-07 LAB
BACTERIA BLD CULT: NORMAL
BACTERIA BLD CULT: NORMAL
OHS QRS DURATION: 132 MS
OHS QTC CALCULATION: 431 MS

## 2025-05-07 NOTE — DISCHARGE INSTRUCTIONS
Diagnosis:  Lightheadedness, stress reaction    I think that your symptoms are likely due to stress and not getting rest or eating.  Your lab tests and EKG did not show any concerning findings.  I am prescribing some medicine that you can take as needed for anxiety and nausea.    Please schedule an appointment with your primary care doctor and your obstetrician for follow-up. If you start to have any new or worsening symptoms, please come back to the emergency department.    Diagnóstico: Mareos, reacción al estrés    Creo que madai síntomas probablemente se deban al estrés y a la falta de descanso y alimentación. Madai análisis de laboratorio y electrocardiograma no mostraron resultados preocupantes. Le estoy recetando medicamentos que puede vickie según sea necesario para la ansiedad y las náuseas.    Por favor, programe elvia demi con santamaria médico de cabecera y santamaria obstetra para seguimiento. Si comienza a tener síntomas nuevos o que empeoran, por favor, acuda de nuevo a urgencias.

## 2025-05-15 ENCOUNTER — TELEPHONE (OUTPATIENT)
Dept: OBSTETRICS AND GYNECOLOGY | Facility: CLINIC | Age: 25
End: 2025-05-15
Payer: MEDICAID

## 2025-05-15 NOTE — TELEPHONE ENCOUNTER
----- Message from Catalina sent at 5/15/2025  8:57 AM CDT -----  Type:  Needs Medical AdviceWho Called: pt ISHMAEL MCKEON [[phone]]Would the patient rather a call back or a response via [employer]? Call back Best Call Back Number: [phone] Additional Information: pt requesting a call back in regards to appt scheduled on 05/27 for postpartum pt wants to know if she can be seen before the 27th

## 2025-05-20 ENCOUNTER — POSTPARTUM VISIT (OUTPATIENT)
Dept: OBSTETRICS AND GYNECOLOGY | Facility: CLINIC | Age: 25
End: 2025-05-20
Payer: MEDICAID

## 2025-05-20 VITALS
SYSTOLIC BLOOD PRESSURE: 117 MMHG | HEART RATE: 83 BPM | BODY MASS INDEX: 34.25 KG/M2 | DIASTOLIC BLOOD PRESSURE: 81 MMHG | WEIGHT: 158.31 LBS

## 2025-05-20 PROCEDURE — 99999 PR PBB SHADOW E&M-EST. PATIENT-LVL II: CPT | Mod: PBBFAC,,, | Performed by: STUDENT IN AN ORGANIZED HEALTH CARE EDUCATION/TRAINING PROGRAM

## 2025-05-20 PROCEDURE — 99212 OFFICE O/P EST SF 10 MIN: CPT | Mod: PBBFAC,PO | Performed by: STUDENT IN AN ORGANIZED HEALTH CARE EDUCATION/TRAINING PROGRAM

## 2025-05-20 NOTE — PROGRESS NOTES
History & Physical  Gynecology      SUBJECTIVE:     Chief Complaint: Postpartum Care       History of Present Illness:  Milagro is now a 24 yr old  who presents to clinic for a postpartum exam following a RLTCS on 25. Of note, according to patient report the  was emergently admitted to the PICU a few days following delivery for respiratory distress. According to the mother, the  remains admitted and under respiratory support. With respect to the mother, she denies any post-operative complaints apart from grief, anxiety and depression regarding the 's clinical status. She is being followed by a psychologist through [facility]. Currently she denies pain. She is tolerating a diet without nausea or vomiting, although admits to minimal appetite. She endorses normal bowel and bladder habits. Her lochia has resolved.      Review of patient's allergies indicates:   Allergen Reactions    Cinnamon Rash       Past Medical History:   Diagnosis Date    H/O  section      Past Surgical History:   Procedure Laterality Date     SECTION N/A 2025    Procedure:  SECTION;  Surgeon: Rambo Andrade MD;  Location: [facility] L&D;  Service: OB/GYN;  Laterality: N/A;    OVARIAN CYST SURGERY Left 2016     OB History          2    Para   2    Term   2            AB        Living   2         SAB        IAB        Ectopic        Multiple   0    Live Births   2               Family History   Problem Relation Name Age of Onset    Hypertension Maternal Grandmother      Diabetes Maternal Grandmother      Hypertension Maternal Grandfather      Diabetes Maternal Grandfather       Social History[1]    Current Outpatient Medications   Medication Sig    ibuprofen (ADVIL,MOTRIN) 800 MG tablet Take 1 tablet (800 mg total) by mouth 3 (three) times daily.    LORazepam (ATIVAN) 1 MG tablet Take 1 tablet (1 mg total) by mouth every 6 (six) hours as needed for Anxiety. (Patient not taking:  Reported on 5/20/2025)    ondansetron (ZOFRAN-ODT) 4 MG TbDL Take 1 tablet (4 mg total) by mouth every 6 (six) hours as needed (Nausea). (Patient not taking: Reported on 5/20/2025)    oxyCODONE-acetaminophen (PERCOCET) 5-325 mg per tablet Take 1 tablet by mouth every 4 (four) hours as needed for Pain. (Patient not taking: Reported on 5/20/2025)    triamcinolone acetonide 0.1% (KENALOG) 0.1 % ointment Apply topically 2 (two) times daily. (Patient not taking: Reported on 5/20/2025)     No current facility-administered medications for this visit.       Review of Systems:  Review of Systems   Constitutional:  Positive for appetite change. Negative for chills, fatigue and fever.   HENT:  Negative for congestion.    Eyes:  Negative for visual disturbance.   Respiratory:  Negative for cough and shortness of breath.    Cardiovascular:  Negative for chest pain and palpitations.   Gastrointestinal:  Negative for abdominal distention, abdominal pain, constipation, diarrhea, nausea and vomiting.   Genitourinary:  Negative for difficulty urinating, dysuria, hematuria, vaginal bleeding and vaginal discharge.   Skin:  Negative for rash.   Neurological:  Negative for dizziness, seizures, light-headedness and headaches.   Hematological:  Does not bruise/bleed easily.   Psychiatric/Behavioral:  Positive for dysphoric mood. The patient is not nervous/anxious.         OBJECTIVE:     Physical Exam:  Vitals:    05/20/25 0958   BP: 117/81   Pulse: 83      Physical Exam  Vitals and nursing note reviewed.   Constitutional:       General: She is not in acute distress.     Appearance: She is well-developed.   HENT:      Head: Normocephalic and atraumatic.   Eyes:      Pupils: Pupils are equal, round, and reactive to light.   Cardiovascular:      Rate and Rhythm: Normal rate and regular rhythm.   Pulmonary:      Effort: Pulmonary effort is normal. No respiratory distress.   Abdominal:      General: There is no distension.      Palpations:  Abdomen is soft. There is no mass.      Tenderness: There is no abdominal tenderness. There is no guarding.       Musculoskeletal:         General: Normal range of motion.      Cervical back: Normal range of motion and neck supple.   Skin:     General: Skin is warm and dry.   Neurological:      Mental Status: She is alert and oriented to person, place, and time.   Psychiatric:         Behavior: Behavior normal.         Thought Content: Thought content normal.         Judgment: Judgment normal.         ASSESSMENT/PLAN:     1. Postpartum exam (Primary)  - Long discussion with patient regarding her grief and anxiety  - Stressed the importance of continued follow-up with established pyschologist  - Stressed mental health/postpartum depression precautions  - Meeting appropriate postpartum goals otherwise  - Declines discussion of contraception at this time    Rambo Andrade M.D.  OB/GYN  [facility]           [1]   Social History  Tobacco Use    Smoking status: Never     Passive exposure: Never    Smokeless tobacco: Never   Substance Use Topics    Alcohol use: Not Currently    Drug use: Never

## 2025-05-28 ENCOUNTER — HOSPITAL ENCOUNTER (EMERGENCY)
Facility: HOSPITAL | Age: 25
Discharge: HOME | End: 2025-05-29
Attending: EMERGENCY MEDICINE
Payer: MEDICAID

## 2025-05-28 DIAGNOSIS — R05.9 COUGH: ICD-10-CM

## 2025-05-28 DIAGNOSIS — J18.9 PNEUMONIA OF RIGHT LUNG DUE TO INFECTIOUS ORGANISM, UNSPECIFIED PART OF LUNG: Primary | ICD-10-CM

## 2025-05-28 LAB
B-HCG UR QL: NEGATIVE
CTP QC/QA: YES
INFLUENZA A MOLECULAR (OHS): NEGATIVE
INFLUENZA B MOLECULAR (OHS): NEGATIVE
SARS-COV-2 RDRP RESP QL NAA+PROBE: NEGATIVE

## 2025-05-28 PROCEDURE — 87502 INFLUENZA DNA AMP PROBE: CPT | Performed by: STUDENT IN AN ORGANIZED HEALTH CARE EDUCATION/TRAINING PROGRAM

## 2025-05-28 PROCEDURE — 25000003 PHARM REV CODE 250: Performed by: EMERGENCY MEDICINE

## 2025-05-28 PROCEDURE — 81025 URINE PREGNANCY TEST: CPT | Performed by: EMERGENCY MEDICINE

## 2025-05-28 PROCEDURE — 99284 EMERGENCY DEPT VISIT MOD MDM: CPT | Mod: 25

## 2025-05-28 PROCEDURE — U0002 COVID-19 LAB TEST NON-CDC: HCPCS | Performed by: STUDENT IN AN ORGANIZED HEALTH CARE EDUCATION/TRAINING PROGRAM

## 2025-05-28 RX ORDER — GUAIFENESIN AND DEXTROMETHORPHAN HYDROBROMIDE 10; 100 MG/5ML; MG/5ML
5 SYRUP ORAL 4 TIMES DAILY PRN
Qty: 120 ML | Refills: 0 | Status: SHIPPED | OUTPATIENT
Start: 2025-05-28 | End: 2025-06-07

## 2025-05-28 RX ORDER — DOXYCYCLINE HYCLATE 100 MG
100 TABLET ORAL
Status: COMPLETED | OUTPATIENT
Start: 2025-05-28 | End: 2025-05-28

## 2025-05-28 RX ORDER — ACETAMINOPHEN 500 MG
1000 TABLET ORAL
Status: COMPLETED | OUTPATIENT
Start: 2025-05-28 | End: 2025-05-28

## 2025-05-28 RX ORDER — METHYLPREDNISOLONE SOD SUCC 125 MG
125 VIAL (EA) INJECTION
Status: COMPLETED | OUTPATIENT
Start: 2025-05-28 | End: 2025-05-29

## 2025-05-28 RX ORDER — DOXYCYCLINE 100 MG/1
100 CAPSULE ORAL 2 TIMES DAILY
Qty: 9 CAPSULE | Refills: 0 | Status: SHIPPED | OUTPATIENT
Start: 2025-05-28 | End: 2025-06-03

## 2025-05-28 RX ORDER — ALBUTEROL SULFATE 2.5 MG/.5ML
2.5 SOLUTION RESPIRATORY (INHALATION)
Status: COMPLETED | OUTPATIENT
Start: 2025-05-29 | End: 2025-05-29

## 2025-05-28 RX ADMIN — DOXYCYCLINE HYCLATE 100 MG: 100 TABLET, FILM COATED ORAL at 11:05

## 2025-05-28 RX ADMIN — ACETAMINOPHEN 1000 MG: 500 TABLET ORAL at 09:05

## 2025-05-29 VITALS
HEART RATE: 90 BPM | HEIGHT: 65 IN | TEMPERATURE: 99 F | SYSTOLIC BLOOD PRESSURE: 124 MMHG | DIASTOLIC BLOOD PRESSURE: 72 MMHG | RESPIRATION RATE: 16 BRPM | OXYGEN SATURATION: 98 % | WEIGHT: 150 LBS | BODY MASS INDEX: 24.99 KG/M2

## 2025-05-29 VITALS
OXYGEN SATURATION: 98 % | BODY MASS INDEX: 33.22 KG/M2 | DIASTOLIC BLOOD PRESSURE: 68 MMHG | RESPIRATION RATE: 18 BRPM | SYSTOLIC BLOOD PRESSURE: 128 MMHG | HEART RATE: 82 BPM | WEIGHT: 154 LBS | HEIGHT: 57 IN | TEMPERATURE: 99 F

## 2025-05-29 DIAGNOSIS — R05.2 SUBACUTE COUGH: ICD-10-CM

## 2025-05-29 DIAGNOSIS — L50.9 URTICARIA: Primary | ICD-10-CM

## 2025-05-29 LAB
ABSOLUTE EOSINOPHIL (OHS): 0.46 K/UL
ABSOLUTE MONOCYTE (OHS): 0.4 K/UL (ref 0.3–1)
ABSOLUTE NEUTROPHIL COUNT (OHS): 4.3 K/UL (ref 1.8–7.7)
ALBUMIN SERPL BCP-MCNC: 3.9 G/DL (ref 3.5–5.2)
ALP SERPL-CCNC: 98 UNIT/L (ref 40–150)
ALT SERPL W/O P-5'-P-CCNC: 23 UNIT/L (ref 10–44)
ANION GAP (OHS): 12 MMOL/L (ref 8–16)
AST SERPL-CCNC: 23 UNIT/L (ref 11–45)
BASOPHILS # BLD AUTO: 0.02 K/UL
BASOPHILS NFR BLD AUTO: 0.3 %
BILIRUB SERPL-MCNC: 0.4 MG/DL (ref 0.1–1)
BUN SERPL-MCNC: 7 MG/DL (ref 6–20)
CALCIUM SERPL-MCNC: 9.1 MG/DL (ref 8.7–10.5)
CHLORIDE SERPL-SCNC: 104 MMOL/L (ref 95–110)
CO2 SERPL-SCNC: 23 MMOL/L (ref 23–29)
CREAT SERPL-MCNC: 0.6 MG/DL (ref 0.5–1.4)
ERYTHROCYTE [DISTWIDTH] IN BLOOD BY AUTOMATED COUNT: 13.2 % (ref 11.5–14.5)
GFR SERPLBLD CREATININE-BSD FMLA CKD-EPI: >60 ML/MIN/1.73/M2
GLUCOSE SERPL-MCNC: 86 MG/DL (ref 70–110)
HCT VFR BLD AUTO: 36.1 % (ref 37–48.5)
HGB BLD-MCNC: 11.7 GM/DL (ref 12–16)
IMM GRANULOCYTES # BLD AUTO: 0.02 K/UL (ref 0–0.04)
IMM GRANULOCYTES NFR BLD AUTO: 0.3 % (ref 0–0.5)
LACTATE SERPL-SCNC: 0.7 MMOL/L (ref 0.5–2.2)
LYMPHOCYTES # BLD AUTO: 2.18 K/UL (ref 1–4.8)
MCH RBC QN AUTO: 27.5 PG (ref 27–31)
MCHC RBC AUTO-ENTMCNC: 32.4 G/DL (ref 32–36)
MCV RBC AUTO: 85 FL (ref 82–98)
NUCLEATED RBC (/100WBC) (OHS): 0 /100 WBC
PLATELET # BLD AUTO: 251 K/UL (ref 150–450)
PMV BLD AUTO: 9.7 FL (ref 9.2–12.9)
POTASSIUM SERPL-SCNC: 3.7 MMOL/L (ref 3.5–5.1)
PROT SERPL-MCNC: 8 GM/DL (ref 6–8.4)
RBC # BLD AUTO: 4.26 M/UL (ref 4–5.4)
RELATIVE EOSINOPHIL (OHS): 6.2 %
RELATIVE LYMPHOCYTE (OHS): 29.5 % (ref 18–48)
RELATIVE MONOCYTE (OHS): 5.4 % (ref 4–15)
RELATIVE NEUTROPHIL (OHS): 58.3 % (ref 38–73)
SODIUM SERPL-SCNC: 139 MMOL/L (ref 136–145)
WBC # BLD AUTO: 7.38 K/UL (ref 3.9–12.7)

## 2025-05-29 PROCEDURE — 63600175 PHARM REV CODE 636 W HCPCS: Mod: JZ,TB | Performed by: EMERGENCY MEDICINE

## 2025-05-29 PROCEDURE — 96361 HYDRATE IV INFUSION ADD-ON: CPT

## 2025-05-29 PROCEDURE — 99283 EMERGENCY DEPT VISIT LOW MDM: CPT | Mod: 25

## 2025-05-29 PROCEDURE — 87040 BLOOD CULTURE FOR BACTERIA: CPT | Performed by: EMERGENCY MEDICINE

## 2025-05-29 PROCEDURE — 25000003 PHARM REV CODE 250: Performed by: EMERGENCY MEDICINE

## 2025-05-29 PROCEDURE — 96374 THER/PROPH/DIAG INJ IV PUSH: CPT

## 2025-05-29 PROCEDURE — 25000003 PHARM REV CODE 250: Performed by: STUDENT IN AN ORGANIZED HEALTH CARE EDUCATION/TRAINING PROGRAM

## 2025-05-29 PROCEDURE — 25000242 PHARM REV CODE 250 ALT 637 W/ HCPCS: Performed by: EMERGENCY MEDICINE

## 2025-05-29 PROCEDURE — 94640 AIRWAY INHALATION TREATMENT: CPT

## 2025-05-29 PROCEDURE — 85025 COMPLETE CBC W/AUTO DIFF WBC: CPT | Performed by: EMERGENCY MEDICINE

## 2025-05-29 PROCEDURE — 63600175 PHARM REV CODE 636 W HCPCS: Performed by: EMERGENCY MEDICINE

## 2025-05-29 PROCEDURE — 82040 ASSAY OF SERUM ALBUMIN: CPT | Performed by: EMERGENCY MEDICINE

## 2025-05-29 PROCEDURE — 83605 ASSAY OF LACTIC ACID: CPT | Performed by: EMERGENCY MEDICINE

## 2025-05-29 RX ORDER — ALBUTEROL SULFATE 90 UG/1
2 INHALANT RESPIRATORY (INHALATION) EVERY 6 HOURS PRN
Qty: 6.7 G | Refills: 0 | Status: SHIPPED | OUTPATIENT
Start: 2025-05-29 | End: 2026-05-29

## 2025-05-29 RX ORDER — DIPHENHYDRAMINE HCL 50 MG
50 CAPSULE ORAL
Status: COMPLETED | OUTPATIENT
Start: 2025-05-29 | End: 2025-05-29

## 2025-05-29 RX ORDER — DOXYCYCLINE HYCLATE 100 MG
100 TABLET ORAL
Status: COMPLETED | OUTPATIENT
Start: 2025-05-29 | End: 2025-05-29

## 2025-05-29 RX ORDER — PREDNISONE 20 MG/1
60 TABLET ORAL
Status: COMPLETED | OUTPATIENT
Start: 2025-05-29 | End: 2025-05-29

## 2025-05-29 RX ORDER — PREDNISONE 50 MG/1
50 TABLET ORAL DAILY
Qty: 2 TABLET | Refills: 0 | Status: SHIPPED | OUTPATIENT
Start: 2025-05-29 | End: 2025-05-31

## 2025-05-29 RX ADMIN — METHYLPREDNISOLONE SODIUM SUCCINATE 125 MG: 125 INJECTION, POWDER, FOR SOLUTION INTRAMUSCULAR; INTRAVENOUS at 12:05

## 2025-05-29 RX ADMIN — DIPHENHYDRAMINE HYDROCHLORIDE 50 MG: 50 CAPSULE ORAL at 04:05

## 2025-05-29 RX ADMIN — ALBUTEROL SULFATE 2.5 MG: 2.5 SOLUTION RESPIRATORY (INHALATION) at 12:05

## 2025-05-29 RX ADMIN — DOXYCYCLINE HYCLATE 100 MG: 100 TABLET, COATED ORAL at 05:05

## 2025-05-29 RX ADMIN — PREDNISONE 60 MG: 20 TABLET ORAL at 05:05

## 2025-05-29 RX ADMIN — SODIUM CHLORIDE 500 ML: 9 INJECTION, SOLUTION INTRAVENOUS at 12:05

## 2025-06-03 LAB
BACTERIA BLD CULT: NORMAL
BACTERIA BLD CULT: NORMAL

## 2025-06-06 ENCOUNTER — PATIENT OUTREACH (OUTPATIENT)
Facility: OTHER | Age: 25
End: 2025-06-06
Payer: MEDICAID

## 2025-07-16 ENCOUNTER — PATIENT OUTREACH (OUTPATIENT)
Facility: OTHER | Age: 25
End: 2025-07-16
Payer: MEDICAID

## 2025-07-16 NOTE — PROGRESS NOTES
ED Navigator called to f/u from last encounter. Pt states that she still experiencing coughing and that she will return to the ED if needed. Pt encouraged to reach out with any concerns at they arise.